# Patient Record
Sex: FEMALE | Race: WHITE | NOT HISPANIC OR LATINO | Employment: UNEMPLOYED | URBAN - METROPOLITAN AREA
[De-identification: names, ages, dates, MRNs, and addresses within clinical notes are randomized per-mention and may not be internally consistent; named-entity substitution may affect disease eponyms.]

---

## 2021-01-01 ENCOUNTER — APPOINTMENT (OUTPATIENT)
Dept: MRI IMAGING | Facility: HOSPITAL | Age: 46
End: 2021-01-01

## 2021-01-01 ENCOUNTER — HOSPITAL ENCOUNTER (EMERGENCY)
Facility: HOSPITAL | Age: 46
Discharge: HOME | End: 2021-08-31
Attending: EMERGENCY MEDICINE | Admitting: EMERGENCY MEDICINE

## 2021-01-01 ENCOUNTER — APPOINTMENT (OUTPATIENT)
Dept: CT IMAGING | Facility: HOSPITAL | Age: 46
End: 2021-01-01

## 2021-01-01 ENCOUNTER — TELEPHONE (OUTPATIENT)
Dept: UROLOGY | Facility: CLINIC | Age: 46
End: 2021-01-01

## 2021-01-01 ENCOUNTER — HOSPITAL ENCOUNTER (EMERGENCY)
Facility: HOSPITAL | Age: 46
Discharge: HOME | End: 2021-12-04
Attending: EMERGENCY MEDICINE | Admitting: EMERGENCY MEDICINE

## 2021-01-01 ENCOUNTER — OFFICE VISIT (OUTPATIENT)
Dept: UROLOGY | Facility: CLINIC | Age: 46
End: 2021-01-01

## 2021-01-01 VITALS
HEIGHT: 63 IN | TEMPERATURE: 98.6 F | OXYGEN SATURATION: 99 % | RESPIRATION RATE: 20 BRPM | BODY MASS INDEX: 17.72 KG/M2 | HEART RATE: 57 BPM | SYSTOLIC BLOOD PRESSURE: 139 MMHG | WEIGHT: 100 LBS | DIASTOLIC BLOOD PRESSURE: 81 MMHG

## 2021-01-01 VITALS
SYSTOLIC BLOOD PRESSURE: 174 MMHG | RESPIRATION RATE: 22 BRPM | DIASTOLIC BLOOD PRESSURE: 94 MMHG | HEART RATE: 57 BPM | BODY MASS INDEX: 20.89 KG/M2 | WEIGHT: 122.36 LBS | TEMPERATURE: 100.7 F | HEIGHT: 64 IN | OXYGEN SATURATION: 100 %

## 2021-01-01 VITALS — WEIGHT: 129.8 LBS | RESPIRATION RATE: 17 BRPM | BODY MASS INDEX: 23 KG/M2 | HEIGHT: 63 IN

## 2021-01-01 DIAGNOSIS — N12 PYELONEPHRITIS: Primary | ICD-10-CM

## 2021-01-01 DIAGNOSIS — N28.89 RENAL MASS: Primary | ICD-10-CM

## 2021-01-01 DIAGNOSIS — C64.1 RENAL CELL CARCINOMA OF RIGHT KIDNEY (HCC): ICD-10-CM

## 2021-01-01 DIAGNOSIS — E87.6 HYPOKALEMIA: ICD-10-CM

## 2021-01-01 DIAGNOSIS — N30.01 ACUTE CYSTITIS WITH HEMATURIA: ICD-10-CM

## 2021-01-01 DIAGNOSIS — N28.89 RENAL MASS: ICD-10-CM

## 2021-01-01 DIAGNOSIS — K59.03 DRUG-INDUCED CONSTIPATION: ICD-10-CM

## 2021-01-01 DIAGNOSIS — F11.93 OPIOID WITHDRAWAL (HCC): ICD-10-CM

## 2021-01-01 DIAGNOSIS — R11.2 NON-INTRACTABLE VOMITING WITH NAUSEA, UNSPECIFIED VOMITING TYPE: Primary | ICD-10-CM

## 2021-01-01 LAB
ALBUMIN SERPL-MCNC: 4.5 G/DL (ref 3.5–5.2)
ALBUMIN SERPL-MCNC: 5.4 G/DL (ref 3.5–5.2)
ALBUMIN/GLOB SERPL: 1.3 G/DL
ALBUMIN/GLOB SERPL: 1.4 G/DL
ALP SERPL-CCNC: 103 U/L (ref 39–117)
ALP SERPL-CCNC: 123 U/L (ref 39–117)
ALT SERPL W P-5'-P-CCNC: 13 U/L (ref 1–33)
ALT SERPL W P-5'-P-CCNC: 28 U/L (ref 1–33)
AMPHET+METHAMPHET UR QL: NEGATIVE
ANION GAP SERPL CALCULATED.3IONS-SCNC: 13.8 MMOL/L (ref 5–15)
ANION GAP SERPL CALCULATED.3IONS-SCNC: 23.4 MMOL/L (ref 5–15)
AST SERPL-CCNC: 17 U/L (ref 1–32)
AST SERPL-CCNC: 23 U/L (ref 1–32)
BACTERIA SPEC AEROBE CULT: ABNORMAL
BACTERIA SPEC AEROBE CULT: NORMAL
BACTERIA UR QL AUTO: ABNORMAL /HPF
BACTERIA UR QL AUTO: ABNORMAL /HPF
BARBITURATES UR QL SCN: NEGATIVE
BASOPHILS # BLD AUTO: 0.03 10*3/MM3 (ref 0–0.2)
BASOPHILS # BLD AUTO: 0.04 10*3/MM3 (ref 0–0.2)
BASOPHILS NFR BLD AUTO: 0.2 % (ref 0–1.5)
BASOPHILS NFR BLD AUTO: 0.4 % (ref 0–1.5)
BENZODIAZ UR QL SCN: NEGATIVE
BILIRUB SERPL-MCNC: 0.3 MG/DL (ref 0–1.2)
BILIRUB SERPL-MCNC: 0.7 MG/DL (ref 0–1.2)
BILIRUB UR QL STRIP: NEGATIVE
BILIRUB UR QL STRIP: NEGATIVE
BUN SERPL-MCNC: 23 MG/DL (ref 6–20)
BUN SERPL-MCNC: 24 MG/DL (ref 6–20)
BUN/CREAT SERPL: 24 (ref 7–25)
BUN/CREAT SERPL: 25.6 (ref 7–25)
CALCIUM SPEC-SCNC: 10.8 MG/DL (ref 8.6–10.5)
CALCIUM SPEC-SCNC: 9.3 MG/DL (ref 8.6–10.5)
CANNABINOIDS SERPL QL: NEGATIVE
CHLORIDE SERPL-SCNC: 100 MMOL/L (ref 98–107)
CHLORIDE SERPL-SCNC: 95 MMOL/L (ref 98–107)
CLARITY UR: ABNORMAL
CLARITY UR: ABNORMAL
CO2 SERPL-SCNC: 25.6 MMOL/L (ref 22–29)
CO2 SERPL-SCNC: 28.2 MMOL/L (ref 22–29)
COCAINE UR QL: NEGATIVE
COLOR UR: YELLOW
COLOR UR: YELLOW
CREAT SERPL-MCNC: 0.9 MG/DL (ref 0.57–1)
CREAT SERPL-MCNC: 1 MG/DL (ref 0.57–1)
D-LACTATE SERPL-SCNC: 2.2 MMOL/L (ref 0.5–2)
D-LACTATE SERPL-SCNC: 3.3 MMOL/L (ref 0.5–2)
DEPRECATED RDW RBC AUTO: 38.3 FL (ref 37–54)
DEPRECATED RDW RBC AUTO: 43.8 FL (ref 37–54)
EOSINOPHIL # BLD AUTO: 0 10*3/MM3 (ref 0–0.4)
EOSINOPHIL # BLD AUTO: 0.01 10*3/MM3 (ref 0–0.4)
EOSINOPHIL NFR BLD AUTO: 0 % (ref 0.3–6.2)
EOSINOPHIL NFR BLD AUTO: 0.1 % (ref 0.3–6.2)
ERYTHROCYTE [DISTWIDTH] IN BLOOD BY AUTOMATED COUNT: 13.3 % (ref 12.3–15.4)
ERYTHROCYTE [DISTWIDTH] IN BLOOD BY AUTOMATED COUNT: 14.6 % (ref 12.3–15.4)
GFR SERPL CREATININE-BSD FRML MDRD: 60 ML/MIN/1.73
GFR SERPL CREATININE-BSD FRML MDRD: 68 ML/MIN/1.73
GLOBULIN UR ELPH-MCNC: 3.2 GM/DL
GLOBULIN UR ELPH-MCNC: 4.1 GM/DL
GLUCOSE SERPL-MCNC: 125 MG/DL (ref 65–99)
GLUCOSE SERPL-MCNC: 131 MG/DL (ref 65–99)
GLUCOSE UR STRIP-MCNC: NEGATIVE MG/DL
GLUCOSE UR STRIP-MCNC: NEGATIVE MG/DL
GRAM STN SPEC: ABNORMAL
HCG INTACT+B SERPL-ACNC: 1.34 MIU/ML
HCG INTACT+B SERPL-ACNC: 2.03 MIU/ML
HCT VFR BLD AUTO: 44.2 % (ref 34–46.6)
HCT VFR BLD AUTO: 45.8 % (ref 34–46.6)
HGB BLD-MCNC: 15.7 G/DL (ref 12–15.9)
HGB BLD-MCNC: 15.8 G/DL (ref 12–15.9)
HGB UR QL STRIP.AUTO: NEGATIVE
HGB UR QL STRIP.AUTO: NEGATIVE
HOLD SPECIMEN: NORMAL
HYALINE CASTS UR QL AUTO: ABNORMAL /LPF
HYALINE CASTS UR QL AUTO: ABNORMAL /LPF
IMM GRANULOCYTES # BLD AUTO: 0.04 10*3/MM3 (ref 0–0.05)
IMM GRANULOCYTES # BLD AUTO: 0.05 10*3/MM3 (ref 0–0.05)
IMM GRANULOCYTES NFR BLD AUTO: 0.3 % (ref 0–0.5)
IMM GRANULOCYTES NFR BLD AUTO: 0.4 % (ref 0–0.5)
ISOLATED FROM: ABNORMAL
KETONES UR QL STRIP: NEGATIVE
KETONES UR QL STRIP: NEGATIVE
LEUKOCYTE ESTERASE UR QL STRIP.AUTO: ABNORMAL
LEUKOCYTE ESTERASE UR QL STRIP.AUTO: ABNORMAL
LIPASE SERPL-CCNC: 57 U/L (ref 13–60)
LIPASE SERPL-CCNC: 66 U/L (ref 13–60)
LYMPHOCYTES # BLD AUTO: 1.38 10*3/MM3 (ref 0.7–3.1)
LYMPHOCYTES # BLD AUTO: 1.58 10*3/MM3 (ref 0.7–3.1)
LYMPHOCYTES NFR BLD AUTO: 13.9 % (ref 19.6–45.3)
LYMPHOCYTES NFR BLD AUTO: 9.5 % (ref 19.6–45.3)
MAGNESIUM SERPL-MCNC: 2 MG/DL (ref 1.6–2.6)
MCH RBC QN AUTO: 28.4 PG (ref 26.6–33)
MCH RBC QN AUTO: 28.5 PG (ref 26.6–33)
MCHC RBC AUTO-ENTMCNC: 34.5 G/DL (ref 31.5–35.7)
MCHC RBC AUTO-ENTMCNC: 35.5 G/DL (ref 31.5–35.7)
MCV RBC AUTO: 79.9 FL (ref 79–97)
MCV RBC AUTO: 82.5 FL (ref 79–97)
METHADONE UR QL SCN: NEGATIVE
MONOCYTES # BLD AUTO: 0.57 10*3/MM3 (ref 0.1–0.9)
MONOCYTES # BLD AUTO: 0.65 10*3/MM3 (ref 0.1–0.9)
MONOCYTES NFR BLD AUTO: 3.9 % (ref 5–12)
MONOCYTES NFR BLD AUTO: 5.7 % (ref 5–12)
NEUTROPHILS NFR BLD AUTO: 12.51 10*3/MM3 (ref 1.7–7)
NEUTROPHILS NFR BLD AUTO: 79.6 % (ref 42.7–76)
NEUTROPHILS NFR BLD AUTO: 86 % (ref 42.7–76)
NEUTROPHILS NFR BLD AUTO: 9.08 10*3/MM3 (ref 1.7–7)
NITRITE UR QL STRIP: NEGATIVE
NITRITE UR QL STRIP: NEGATIVE
NRBC BLD AUTO-RTO: 0 /100 WBC (ref 0–0.2)
NRBC BLD AUTO-RTO: 0 /100 WBC (ref 0–0.2)
OPIATES UR QL: NEGATIVE
OXYCODONE UR QL SCN: NEGATIVE
PH UR STRIP.AUTO: 7 [PH] (ref 5–8)
PH UR STRIP.AUTO: 7.5 [PH] (ref 5–8)
PLATELET # BLD AUTO: 327 10*3/MM3 (ref 140–450)
PLATELET # BLD AUTO: 374 10*3/MM3 (ref 140–450)
PMV BLD AUTO: 10.1 FL (ref 6–12)
PMV BLD AUTO: 9.9 FL (ref 6–12)
POTASSIUM SERPL-SCNC: 3 MMOL/L (ref 3.5–5.2)
POTASSIUM SERPL-SCNC: 3.5 MMOL/L (ref 3.5–5.2)
PROT SERPL-MCNC: 7.7 G/DL (ref 6–8.5)
PROT SERPL-MCNC: 9.5 G/DL (ref 6–8.5)
PROT UR QL STRIP: ABNORMAL
PROT UR QL STRIP: ABNORMAL
RBC # BLD AUTO: 5.53 10*6/MM3 (ref 3.77–5.28)
RBC # BLD AUTO: 5.55 10*6/MM3 (ref 3.77–5.28)
RBC # UR STRIP: ABNORMAL /HPF
RBC # UR: ABNORMAL /HPF
REF LAB TEST METHOD: ABNORMAL
REF LAB TEST METHOD: ABNORMAL
SARS-COV-2 N GENE RESP QL NAA+PROBE: NOT DETECTED
SODIUM SERPL-SCNC: 142 MMOL/L (ref 136–145)
SODIUM SERPL-SCNC: 144 MMOL/L (ref 136–145)
SP GR UR STRIP: 1.03 (ref 1–1.03)
SP GR UR STRIP: >1.03 (ref 1–1.03)
SQUAMOUS #/AREA URNS HPF: ABNORMAL /HPF
SQUAMOUS #/AREA URNS HPF: ABNORMAL /HPF
UROBILINOGEN UR QL STRIP: ABNORMAL
UROBILINOGEN UR QL STRIP: ABNORMAL
WBC # BLD AUTO: 11.4 10*3/MM3 (ref 3.4–10.8)
WBC # UR STRIP: ABNORMAL /HPF
WBC NRBC COR # BLD: 14.54 10*3/MM3 (ref 3.4–10.8)
WBC UR QL AUTO: ABNORMAL /HPF
WHOLE BLOOD HOLD SPECIMEN: NORMAL

## 2021-01-01 PROCEDURE — 80307 DRUG TEST PRSMV CHEM ANLYZR: CPT | Performed by: NURSE PRACTITIONER

## 2021-01-01 PROCEDURE — 96375 TX/PRO/DX INJ NEW DRUG ADDON: CPT

## 2021-01-01 PROCEDURE — 99284 EMERGENCY DEPT VISIT MOD MDM: CPT

## 2021-01-01 PROCEDURE — 83735 ASSAY OF MAGNESIUM: CPT | Performed by: NURSE PRACTITIONER

## 2021-01-01 PROCEDURE — 85025 COMPLETE CBC W/AUTO DIFF WBC: CPT | Performed by: EMERGENCY MEDICINE

## 2021-01-01 PROCEDURE — 96361 HYDRATE IV INFUSION ADD-ON: CPT

## 2021-01-01 PROCEDURE — 25010000002 CEFTRIAXONE PER 250 MG: Performed by: NURSE PRACTITIONER

## 2021-01-01 PROCEDURE — 81001 URINALYSIS AUTO W/SCOPE: CPT | Performed by: EMERGENCY MEDICINE

## 2021-01-01 PROCEDURE — 25010000002 DIPHENHYDRAMINE PER 50 MG: Performed by: NURSE PRACTITIONER

## 2021-01-01 PROCEDURE — 74176 CT ABD & PELVIS W/O CONTRAST: CPT

## 2021-01-01 PROCEDURE — 99204 OFFICE O/P NEW MOD 45 MIN: CPT | Performed by: UROLOGY

## 2021-01-01 PROCEDURE — 87147 CULTURE TYPE IMMUNOLOGIC: CPT | Performed by: NURSE PRACTITIONER

## 2021-01-01 PROCEDURE — 83690 ASSAY OF LIPASE: CPT | Performed by: EMERGENCY MEDICINE

## 2021-01-01 PROCEDURE — 96365 THER/PROPH/DIAG IV INF INIT: CPT

## 2021-01-01 PROCEDURE — 87635 SARS-COV-2 COVID-19 AMP PRB: CPT | Performed by: EMERGENCY MEDICINE

## 2021-01-01 PROCEDURE — 25010000002 KETOROLAC TROMETHAMINE PER 15 MG: Performed by: EMERGENCY MEDICINE

## 2021-01-01 PROCEDURE — 74177 CT ABD & PELVIS W/CONTRAST: CPT

## 2021-01-01 PROCEDURE — 87040 BLOOD CULTURE FOR BACTERIA: CPT | Performed by: NURSE PRACTITIONER

## 2021-01-01 PROCEDURE — 25010000002 ONDANSETRON PER 1 MG: Performed by: EMERGENCY MEDICINE

## 2021-01-01 PROCEDURE — 25010000002 METOCLOPRAMIDE PER 10 MG: Performed by: NURSE PRACTITIONER

## 2021-01-01 PROCEDURE — 80053 COMPREHEN METABOLIC PANEL: CPT | Performed by: EMERGENCY MEDICINE

## 2021-01-01 PROCEDURE — 0 IOPAMIDOL PER 1 ML: Performed by: EMERGENCY MEDICINE

## 2021-01-01 PROCEDURE — 84702 CHORIONIC GONADOTROPIN TEST: CPT | Performed by: EMERGENCY MEDICINE

## 2021-01-01 PROCEDURE — 25010000002 KETOROLAC TROMETHAMINE PER 15 MG: Performed by: NURSE PRACTITIONER

## 2021-01-01 PROCEDURE — 25010000002 CEFTRIAXONE PER 250 MG: Performed by: EMERGENCY MEDICINE

## 2021-01-01 PROCEDURE — 83605 ASSAY OF LACTIC ACID: CPT | Performed by: NURSE PRACTITIONER

## 2021-01-01 PROCEDURE — 25010000002 ONDANSETRON PER 1 MG: Performed by: NURSE PRACTITIONER

## 2021-01-01 RX ORDER — IBUPROFEN 600 MG/1
600 TABLET ORAL ONCE
Status: COMPLETED | OUTPATIENT
Start: 2021-01-01 | End: 2021-01-01

## 2021-01-01 RX ORDER — NAPROXEN 500 MG/1
500 TABLET ORAL 2 TIMES DAILY WITH MEALS
Qty: 14 TABLET | Refills: 0 | OUTPATIENT
Start: 2021-01-01 | End: 2021-01-01

## 2021-01-01 RX ORDER — ACETAMINOPHEN 500 MG
1000 TABLET ORAL EVERY 6 HOURS PRN
Qty: 30 TABLET | Refills: 0 | OUTPATIENT
Start: 2021-01-01 | End: 2021-01-01

## 2021-01-01 RX ORDER — ACETAMINOPHEN 325 MG/1
650 TABLET ORAL ONCE
Status: COMPLETED | OUTPATIENT
Start: 2021-01-01 | End: 2021-01-01

## 2021-01-01 RX ORDER — CEFTRIAXONE SODIUM 1 G/50ML
1 INJECTION, SOLUTION INTRAVENOUS ONCE
Status: COMPLETED | OUTPATIENT
Start: 2021-01-01 | End: 2021-01-01

## 2021-01-01 RX ORDER — POLYETHYLENE GLYCOL 3350 17 G/17G
17 POWDER, FOR SOLUTION ORAL DAILY
Qty: 10 PACKET | Refills: 0 | Status: SHIPPED | OUTPATIENT
Start: 2021-01-01 | End: 2021-01-01

## 2021-01-01 RX ORDER — FAMOTIDINE 10 MG/ML
20 INJECTION, SOLUTION INTRAVENOUS ONCE
Status: COMPLETED | OUTPATIENT
Start: 2021-01-01 | End: 2021-01-01

## 2021-01-01 RX ORDER — METOCLOPRAMIDE HYDROCHLORIDE 5 MG/ML
10 INJECTION INTRAMUSCULAR; INTRAVENOUS ONCE
Status: COMPLETED | OUTPATIENT
Start: 2021-01-01 | End: 2021-01-01

## 2021-01-01 RX ORDER — POTASSIUM CHLORIDE 750 MG/1
40 CAPSULE, EXTENDED RELEASE ORAL ONCE
Status: COMPLETED | OUTPATIENT
Start: 2021-01-01 | End: 2021-01-01

## 2021-01-01 RX ORDER — KETOROLAC TROMETHAMINE 15 MG/ML
15 INJECTION, SOLUTION INTRAMUSCULAR; INTRAVENOUS ONCE
Status: COMPLETED | OUTPATIENT
Start: 2021-01-01 | End: 2021-01-01

## 2021-01-01 RX ORDER — ONDANSETRON 4 MG/1
4 TABLET, ORALLY DISINTEGRATING ORAL EVERY 6 HOURS PRN
Qty: 15 TABLET | Refills: 0 | Status: SHIPPED | OUTPATIENT
Start: 2021-01-01 | End: 2022-01-01

## 2021-01-01 RX ORDER — ONDANSETRON 2 MG/ML
4 INJECTION INTRAMUSCULAR; INTRAVENOUS ONCE
Status: COMPLETED | OUTPATIENT
Start: 2021-01-01 | End: 2021-01-01

## 2021-01-01 RX ORDER — CEPHALEXIN 500 MG/1
500 CAPSULE ORAL 4 TIMES DAILY
Qty: 28 CAPSULE | Refills: 0 | Status: SHIPPED | OUTPATIENT
Start: 2021-01-01 | End: 2021-01-01

## 2021-01-01 RX ORDER — ONDANSETRON 4 MG/1
4 TABLET, ORALLY DISINTEGRATING ORAL EVERY 8 HOURS PRN
Qty: 9 TABLET | Refills: 0 | OUTPATIENT
Start: 2021-01-01 | End: 2021-01-01

## 2021-01-01 RX ORDER — KETOROLAC TROMETHAMINE 30 MG/ML
30 INJECTION, SOLUTION INTRAMUSCULAR; INTRAVENOUS ONCE
Status: COMPLETED | OUTPATIENT
Start: 2021-01-01 | End: 2021-01-01

## 2021-01-01 RX ORDER — SODIUM CHLORIDE 0.9 % (FLUSH) 0.9 %
10 SYRINGE (ML) INJECTION AS NEEDED
Status: DISCONTINUED | OUTPATIENT
Start: 2021-01-01 | End: 2021-01-01 | Stop reason: HOSPADM

## 2021-01-01 RX ORDER — DICYCLOMINE HCL 20 MG
20 TABLET ORAL EVERY 6 HOURS PRN
Qty: 30 TABLET | Refills: 0 | Status: SHIPPED | OUTPATIENT
Start: 2021-01-01 | End: 2022-01-01

## 2021-01-01 RX ORDER — CEPHALEXIN 500 MG/1
500 CAPSULE ORAL 4 TIMES DAILY
Qty: 28 CAPSULE | Refills: 0 | OUTPATIENT
Start: 2021-01-01 | End: 2021-01-01

## 2021-01-01 RX ORDER — DIPHENHYDRAMINE HYDROCHLORIDE 50 MG/ML
25 INJECTION INTRAMUSCULAR; INTRAVENOUS ONCE
Status: COMPLETED | OUTPATIENT
Start: 2021-01-01 | End: 2021-01-01

## 2021-01-01 RX ADMIN — METOCLOPRAMIDE 10 MG: 5 INJECTION, SOLUTION INTRAMUSCULAR; INTRAVENOUS at 02:07

## 2021-01-01 RX ADMIN — SODIUM CHLORIDE 1 G: 900 INJECTION INTRAVENOUS at 19:26

## 2021-01-01 RX ADMIN — IOPAMIDOL 100 ML: 755 INJECTION, SOLUTION INTRAVENOUS at 18:30

## 2021-01-01 RX ADMIN — ONDANSETRON 4 MG: 2 INJECTION INTRAMUSCULAR; INTRAVENOUS at 17:15

## 2021-01-01 RX ADMIN — IBUPROFEN 600 MG: 600 TABLET ORAL at 01:16

## 2021-01-01 RX ADMIN — KETOROLAC TROMETHAMINE 30 MG: 30 INJECTION, SOLUTION INTRAMUSCULAR; INTRAVENOUS at 21:16

## 2021-01-01 RX ADMIN — SODIUM CHLORIDE 1000 ML: 9 INJECTION, SOLUTION INTRAVENOUS at 21:13

## 2021-01-01 RX ADMIN — FAMOTIDINE 20 MG: 10 INJECTION INTRAVENOUS at 21:15

## 2021-01-01 RX ADMIN — SODIUM CHLORIDE 1665 ML: 9 INJECTION, SOLUTION INTRAVENOUS at 23:49

## 2021-01-01 RX ADMIN — KETOROLAC TROMETHAMINE 15 MG: 15 INJECTION, SOLUTION INTRAMUSCULAR; INTRAVENOUS at 17:53

## 2021-01-01 RX ADMIN — DIPHENHYDRAMINE HYDROCHLORIDE 25 MG: 50 INJECTION, SOLUTION INTRAMUSCULAR; INTRAVENOUS at 02:06

## 2021-01-01 RX ADMIN — CEFTRIAXONE SODIUM 1 G: 1 INJECTION, SOLUTION INTRAVENOUS at 23:48

## 2021-01-01 RX ADMIN — ONDANSETRON 4 MG: 2 INJECTION INTRAMUSCULAR; INTRAVENOUS at 21:15

## 2021-01-01 RX ADMIN — POTASSIUM CHLORIDE 40 MEQ: 750 CAPSULE, EXTENDED RELEASE ORAL at 01:17

## 2021-01-01 RX ADMIN — SODIUM CHLORIDE 1000 ML: 9 INJECTION, SOLUTION INTRAVENOUS at 19:26

## 2021-01-01 RX ADMIN — SODIUM CHLORIDE 1000 ML: 9 INJECTION, SOLUTION INTRAVENOUS at 17:15

## 2021-01-01 RX ADMIN — ACETAMINOPHEN 650 MG: 325 TABLET ORAL at 21:15

## 2021-09-21 PROBLEM — N28.89 RENAL MASS: Status: ACTIVE | Noted: 2021-01-01

## 2021-09-22 NOTE — PROGRESS NOTES
Chief Complaint: Urologic complaint    Subjective         History of Present Illness  Mariam Blanco is a 45 y.o. female presents to [facility] to be seen for UTI renal mass    Patient recently seen in the emergency room for UTI she was also low-grade fevers and nausea.  She was treated with Keflex.  Patient is feeling better after antibiotics.  She is having some lower extremity swelling which are improving.  Symmetrical.    8/31/2021 CT abdomen/pelvis with - multiple areas of focal scarring on each kidney. Medial right kidney has 3 x 2.7 cm circumscribed mass with Hounsfield units of 103. Either hyperdense cyst or neoplasm. Small cyst in the medial portion of the left kidney.    8/31/21  UA-0-2 RBC, many white cells, bacteria 4+ 0-2 epithelial cells, nitrite negative    No gross hematuria    Patient had 1 kidney stone    No urologic family history,   Has never had any urologic surgery.    No cardiopulmonary history.  Smokes 0.5 packs/day .  Patient does not use blood thinner.        Objective     History reviewed. No pertinent past medical history.    History reviewed. No pertinent surgical history.      Current Outpatient Medications:   •  acetaminophen (TYLENOL) 500 MG tablet, Take 2 tablets by mouth Every 6 (Six) Hours As Needed for Mild Pain  or Fever., Disp: 30 tablet, Rfl: 0  •  cephalexin (KEFLEX) 500 MG capsule, Take 1 capsule by mouth 4 (Four) Times a Day., Disp: 28 capsule, Rfl: 0  •  naproxen (EC NAPROSYN) 500 MG EC tablet, Take 1 tablet by mouth 2 (Two) Times a Day With Meals., Disp: 14 tablet, Rfl: 0  •  ondansetron ODT (ZOFRAN-ODT) 4 MG disintegrating tablet, Place 1 tablet on the tongue Every 8 (Eight) Hours As Needed for Nausea., Disp: 9 tablet, Rfl: 0    No Known Allergies     History reviewed. No pertinent family history.    Social History     Socioeconomic History   • Marital status:      Spouse name: Not on file   • Number of children: Not on file   • Years of  "education: Not on file   • Highest education level: Not on file   Tobacco Use   • Smoking status: Current Every Day Smoker     Packs/day: 1.50   • Smokeless tobacco: Never Used   Vaping Use   • Vaping Use: Never used   Substance and Sexual Activity   • Alcohol use: Not Currently   • Drug use: Never       Vital Signs:   Resp 17   Ht 160 cm (63\")   Wt 58.9 kg (129 lb 12.8 oz)   BMI 22.99 kg/m²      Physical exam    Alert and orient x3  Well appearing, well developed, in no acute distress   Unlabored respirations  Nontender/nondistended  Grossly oriented to person, place and time, judgment is intact, normal mood and affect              Assessment and Plan    Diagnoses and all orders for this visit:    1. Renal mass (Primary)      CT images and read reviewed and discussed with patient    Records reviewed and summarized in chart    Patient with a possible enhancing mass in her right kidney.  After discussion of risk benefits we will get an MRI of the abdomen with and without contrast to rule out enhancing lesion. we did discuss that this could be a possible kidney malignancy and she must continue to follow-up or could be detrimental to her health or cause death.  Patient voiced understanding    Follow-up after MRI     "

## 2021-11-08 NOTE — TELEPHONE ENCOUNTER
Called pt to see if she about rescheduling the MRI of the abdomen. No answer. LMOM for her to call back. Dr. Schwartz was evaluating her for a kidney mass so MRI is necessary and encouraged if she calls back.

## 2021-12-04 NOTE — DISCHARGE INSTRUCTIONS
Clear liquids.  Advance diet as tolerated.  Try to drink plenty of fluids.    Rest.    Take medications as prescribed.    Fever control by alternating Tylenol and Motrin.    Follow-up with your PCP and urologist next week.  Call Monday for appointment

## 2021-12-04 NOTE — ED PROVIDER NOTES
Time: 03:36 EST  Arrived by: EMS  Chief Complaint: Nausea and vomiting  History provided by: Patient  History is limited by: N/A    History of Present Illness:  Patient is a 45 y.o. 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 Primary symptoms do not include diarrhea, hematemesis, hematochezia, dysuria, arthralgias or rash. The illness began 6 to 7 days ago.   The abdominal pain began more than 2 days ago. The abdominal pain has been gradually worsening since its onset. The abdominal pain is located in the right flank. The abdominal pain radiates to the RLQ. The severity of the abdominal pain is 7/10. The abdominal pain is relieved by nothing.   Nausea began 6 to 7 days ago. The nausea is associated with eating (Opioid withdrawal).   The illness is also significant for chills and back pain.   Nausea  The primary symptoms include fever ( today), fatigue, abdominal pain, nausea, vomiting and myalgias. Primary symptoms do not include diarrhea, hematemesis, hematochezia, dysuria, arthralgias or rash.   The illness is also significant for chills and back pain.   Withdrawal  Associated symptoms: abdominal pain, fatigue, fever ( today), myalgias, nausea and vomiting    Associated symptoms: no chest pain, no congestion, no cough, no diarrhea, no ear pain, no rash, no rhinorrhea, no shortness of breath and no sore throat            Similar Symptoms Previously: went 3 years without using.   Recently seen: follow ups ith dr noland for renal cell carcinoma. Going to have right nephrectomy but hasnt been scheduled      Patient Care Team  Primary Care Provider: Shyanne francisco  Urologist dr noland    Past Medical History:     No Known Allergies  Past Medical History:   Diagnosis Date   •  Cancer (HCC)    • Cancer of kidney (HCC)      Past Surgical History:   Procedure Laterality Date   • CHOLECYSTECTOMY       History reviewed. No pertinent family history.    Home Medications:  Prior to Admission medications    Medication Sig Start Date End Date Taking? Authorizing Provider   acetaminophen (TYLENOL) 500 MG tablet Take 2 tablets by mouth Every 6 (Six) Hours As Needed for Mild Pain  or Fever. 8/31/21   Stanford Hill, DO   cephalexin (KEFLEX) 500 MG capsule Take 1 capsule by mouth 4 (Four) Times a Day. 8/31/21   Stanford Hill DO   naproxen (EC NAPROSYN) 500 MG EC tablet Take 1 tablet by mouth 2 (Two) Times a Day With Meals. 8/31/21   Stanford Hill, DO   ondansetron ODT (ZOFRAN-ODT) 4 MG disintegrating tablet Place 1 tablet on the tongue Every 8 (Eight) Hours As Needed for Nausea. 8/31/21   Stanford Hill, DO        Social History:   PT  reports that she has been smoking. She has been smoking about 0.50 packs per day. She has never used smokeless tobacco. She reports current drug use. She reports that she does not drink alcohol.    Record Review:  I have reviewed the patient's records in [facility].     Review of Systems  Review of Systems   Constitutional: Positive for chills, fatigue and fever ( today).   HENT: Negative for congestion, ear pain, rhinorrhea and sore throat.    Eyes: Negative.    Respiratory: Negative for cough and shortness of breath.    Cardiovascular: Negative for chest pain.   Gastrointestinal: Positive for abdominal pain, nausea and vomiting. Negative for diarrhea, hematemesis and hematochezia.   Genitourinary: Positive for decreased urine volume and flank pain ( right side). Negative for dysuria.   Musculoskeletal: Positive for back pain and myalgias. Negative for arthralgias.   Skin: Negative for rash.   Neurological: Negative.    Hematological: Negative.    Psychiatric/Behavioral: The patient is nervous/anxious.         Physical Exam  /94   Pulse 57   Temp (!) 100.7 °F (38.2 °C)  "(Oral)   Resp 22   Ht 162.6 cm (64\")   Wt 55.5 kg (122 lb 5.7 oz)   SpO2 100%   BMI 21.00 kg/m²     Physical Exam  Vitals and nursing note reviewed.   Constitutional:       General: She is not in acute distress.     Appearance: She is ill-appearing. She is not toxic-appearing.   HENT:      Head: Normocephalic and atraumatic.      Right Ear: Tympanic membrane, ear canal and external ear normal.      Left Ear: Tympanic membrane, ear canal and external ear normal.      Nose: Nose normal.      Mouth/Throat:      Mouth: Mucous membranes are moist.      Pharynx: No oropharyngeal exudate or posterior oropharyngeal erythema.   Eyes:      General: No scleral icterus.     Conjunctiva/sclera: Conjunctivae normal.      Pupils: Pupils are equal, round, and reactive to light.   Cardiovascular:      Rate and Rhythm: Normal rate and regular rhythm.      Pulses: Normal pulses.      Heart sounds: Normal heart sounds.   Pulmonary:      Effort: Pulmonary effort is normal. No respiratory distress.      Breath sounds: Normal breath sounds.   Abdominal:      General: Abdomen is flat. Bowel sounds are normal.      Palpations: Abdomen is soft.      Tenderness: There is abdominal tenderness ( Right lower quadrant and right lateral abdomen). There is right CVA tenderness. There is no left CVA tenderness.   Musculoskeletal:         General: Normal range of motion.      Cervical back: Normal range of motion and neck supple.   Skin:     General: Skin is warm and dry.   Neurological:      Mental Status: She is alert and oriented to person, place, and time.   Psychiatric:         Mood and Affect: Mood normal.         Behavior: Behavior normal.         Thought Content: Thought content normal.         Judgment: Judgment normal.                  ED Course  /94   Pulse 57   Temp (!) 100.7 °F (38.2 °C) (Oral)   Resp 22   Ht 162.6 cm (64\")   Wt 55.5 kg (122 lb 5.7 oz)   SpO2 100%   BMI 21.00 kg/m²   Results for orders placed or " performed during the hospital encounter of 12/03/21   Comprehensive Metabolic Panel    Specimen: Blood   Result Value Ref Range    Glucose 131 (H) 65 - 99 mg/dL    BUN 23 (H) 6 - 20 mg/dL    Creatinine 0.90 0.57 - 1.00 mg/dL    Sodium 142 136 - 145 mmol/L    Potassium 3.0 (L) 3.5 - 5.2 mmol/L    Chloride 100 98 - 107 mmol/L    CO2 28.2 22.0 - 29.0 mmol/L    Calcium 9.3 8.6 - 10.5 mg/dL    Total Protein 7.7 6.0 - 8.5 g/dL    Albumin 4.50 3.50 - 5.20 g/dL    ALT (SGPT) 28 1 - 33 U/L    AST (SGOT) 17 1 - 32 U/L    Alkaline Phosphatase 103 39 - 117 U/L    Total Bilirubin 0.3 0.0 - 1.2 mg/dL    eGFR Non African Amer 68 >60 mL/min/1.73    Globulin 3.2 gm/dL    A/G Ratio 1.4 g/dL    BUN/Creatinine Ratio 25.6 (H) 7.0 - 25.0    Anion Gap 13.8 5.0 - 15.0 mmol/L   Lipase    Specimen: Blood   Result Value Ref Range    Lipase 66 (H) 13 - 60 U/L   Urinalysis With Microscopic If Indicated (No Culture) - Urine, Clean Catch    Specimen: Urine, Clean Catch   Result Value Ref Range    Color, UA Yellow Yellow, Straw    Appearance, UA Cloudy (A) Clear    pH, UA 7.5 5.0 - 8.0    Specific Gravity, UA 1.026 1.005 - 1.030    Glucose, UA Negative Negative    Ketones, UA Negative Negative    Bilirubin, UA Negative Negative    Blood, UA Negative Negative    Protein,  mg/dL (2+) (A) Negative    Leuk Esterase, UA Small (1+) (A) Negative    Nitrite, UA Negative Negative    Urobilinogen, UA 1.0 E.U./dL 0.2 - 1.0 E.U./dL   hCG, Quantitative, Pregnancy    Specimen: Blood   Result Value Ref Range    HCG Quantitative 1.34 mIU/mL   CBC Auto Differential    Specimen: Blood   Result Value Ref Range    WBC 14.54 (H) 3.40 - 10.80 10*3/mm3    RBC 5.53 (H) 3.77 - 5.28 10*6/mm3    Hemoglobin 15.7 12.0 - 15.9 g/dL    Hematocrit 44.2 34.0 - 46.6 %    MCV 79.9 79.0 - 97.0 fL    MCH 28.4 26.6 - 33.0 pg    MCHC 35.5 31.5 - 35.7 g/dL    RDW 13.3 12.3 - 15.4 %    RDW-SD 38.3 37.0 - 54.0 fl    MPV 9.9 6.0 - 12.0 fL    Platelets 327 140 - 450 10*3/mm3     Neutrophil % 86.0 (H) 42.7 - 76.0 %    Lymphocyte % 9.5 (L) 19.6 - 45.3 %    Monocyte % 3.9 (L) 5.0 - 12.0 %    Eosinophil % 0.1 (L) 0.3 - 6.2 %    Basophil % 0.2 0.0 - 1.5 %    Immature Grans % 0.3 0.0 - 0.5 %    Neutrophils, Absolute 12.51 (H) 1.70 - 7.00 10*3/mm3    Lymphocytes, Absolute 1.38 0.70 - 3.10 10*3/mm3    Monocytes, Absolute 0.57 0.10 - 0.90 10*3/mm3    Eosinophils, Absolute 0.01 0.00 - 0.40 10*3/mm3    Basophils, Absolute 0.03 0.00 - 0.20 10*3/mm3    Immature Grans, Absolute 0.04 0.00 - 0.05 10*3/mm3    nRBC 0.0 0.0 - 0.2 /100 WBC   Lactic Acid, Plasma    Specimen: Blood   Result Value Ref Range    Lactate 3.3 (C) 0.5 - 2.0 mmol/L   Urine Drug Screen - Urine, Clean Catch    Specimen: Urine, Clean Catch   Result Value Ref Range    Amphet/Methamphet, Screen Negative Negative    Barbiturates Screen, Urine Negative Negative    Benzodiazepine Screen, Urine Negative Negative    Cocaine Screen, Urine Negative Negative    Opiate Screen Negative Negative    THC, Screen, Urine Negative Negative    Methadone Screen, Urine Negative Negative    Oxycodone Screen, Urine Negative Negative   STAT Lactic Acid, Reflex    Specimen: Blood   Result Value Ref Range    Lactate 2.2 (C) 0.5 - 2.0 mmol/L   Urinalysis, Microscopic Only - Urine, Clean Catch    Specimen: Urine, Clean Catch   Result Value Ref Range    RBC, UA 3-5 (A) None Seen /HPF    WBC, UA 31-50 (A) None Seen /HPF    Bacteria, UA 2+ (A) None Seen /HPF    Squamous Epithelial Cells, UA 3-6 (A) None Seen, 0-2 /HPF    Hyaline Casts, UA 13-20 None Seen /LPF    Methodology Automated Microscopy    Magnesium    Specimen: Blood   Result Value Ref Range    Magnesium 2.0 1.6 - 2.6 mg/dL   Green Top (Gel)   Result Value Ref Range    Extra Tube Hold for add-ons.    Lavender Top   Result Value Ref Range    Extra Tube hold for add-on    Gold Top - SST   Result Value Ref Range    Extra Tube Hold for add-ons.    Light Blue Top   Result Value Ref Range    Extra Tube hold for  add-on    Lavender Top   Result Value Ref Range    Extra Tube hold for add-on      Medications   sodium chloride 0.9 % bolus 1,000 mL (0 mL Intravenous Stopped 12/3/21 2348)   ondansetron (ZOFRAN) injection 4 mg (4 mg Intravenous Given 12/3/21 2115)   famotidine (PEPCID) injection 20 mg (20 mg Intravenous Given 12/3/21 2115)   ketorolac (TORADOL) injection 30 mg (30 mg Intravenous Given 12/3/21 2116)   acetaminophen (TYLENOL) tablet 650 mg (650 mg Oral Given 12/3/21 2115)   sodium chloride 0.9 % bolus 1,665 mL (0 mL/kg × 55.5 kg Intravenous Stopped 12/4/21 0111)   cefTRIAXone (ROCEPHIN) IVPB 1 g (0 g Intravenous Stopped 12/4/21 0111)   ibuprofen (ADVIL,MOTRIN) tablet 600 mg (600 mg Oral Given 12/4/21 0116)   potassium chloride (MICRO-K) CR capsule 40 mEq (40 mEq Oral Given 12/4/21 0117)   metoclopramide (REGLAN) injection 10 mg (10 mg Intravenous Given 12/4/21 0207)   diphenhydrAMINE (BENADRYL) injection 25 mg (25 mg Intravenous Given 12/4/21 0206)     CT Abdomen Pelvis Without Contrast    Result Date: 12/3/2021  Narrative: PROCEDURE: CT ABDOMEN PELVIS WO CONTRAST  COMPARISON: [facility], CT, CT ABD-PELVIS W CONTRAST, 8/31/2021, 18:27.  INDICATIONS: RIGHT-SIDED ABDOMEN PAIN & NAUSEA TODAY.  TECHNIQUE: 647 CT images were created without intravenous or oral contrast agent administration.   PROTOCOL:   Standard imaging protocol performed    RADIATION:   Automated exposure control was utilized to minimize radiation dose.  FINDINGS: Formed stool is seen throughout the colon.  There may be fecal stasis as with constipation.  Fecal impaction is possible.  The maximum transverse diameter of the rectum is 8 cm.  No definite stercoral ulceration.  Similar findings were seen previously.  No acute appendicitis.  No acute colitis or diverticulitis.  No pneumoperitoneum or pneumatosis.  No mechanical bowel obstruction.  There are bilateral breast implants with dense calcification of the fibrous capsules, as before.   A small hiatal hernia is seen.  The patient has undergone cholecystectomy.  Renal cortical scarring is seen bilaterally.  No renal stones or hydronephrosis or obstructive uropathy.  Again demonstrated is a right renal mass, better seen on the prior enhanced CT examination.  It measures about 3.4 cm in greatest diameter.  Previously, it measured about 3 cm in greatest diameter.  No definite associated pulmonary or osseous metastatic disease.  Please correlate with interval diagnosis and treatment history.  No hydronephrosis or obstructive uropathy is seen.  No urinary bladder calculi are identified.  Again mixed lytic and sclerotic areas are seen in the periarticular regions of the pelvis and involving the hip joints and may be related to Paget disease.  A congenital chondrodysplasia is possible.  The other incidental nonemergent findings are as described in the prior CT exam report from 8/31/2021.  CONCLUSION:   1. Persistent suspicious right renal mass is noted, which is most suggestive of a right renal cell carcinoma.  The finding is better seen on the prior enhanced CT examination.  Please correlate with interval diagnosis or treatment history.   2. Fecal stasis as with constipation and fecal impaction are suggested.   3. No mechanical bowel obstruction is appreciated.  No pneumoperitoneum or pneumatosis.  No perirectal fluid collections are identified.  No definite stercoral ulceration.   4. Otherwise, no significant interval change is seen since the prior 8/31/2021 CT study.     Pertinent findings were phoned to EARLENE Summers (ordering/attending [facility] ED Clinician) at approximately 2143 hours on 12/3/2021.  TRIPP BAIRES JR, MD       Electronically Signed and Approved By: TRIPP BAIRES JR, MD on 12/03/2021 at 21:46               Medical Decision Making:                     MDM  Number of Diagnoses or Management Options  Acute cystitis with hematuria  Drug-induced constipation  Hypokalemia  Non-intractable  vomiting with nausea, unspecified vomiting type  Opioid withdrawal (HCC)  Renal cell carcinoma of right kidney (HCC)  Diagnosis management comments: I have spoken with the patient. I have explained the patient´s condition, diagnoses and treatment plan based on the information available to me at this time. I have answered the patient's questions and addressed any concerns. The patient has a good  understanding of the patient´s diagnosis, condition, and treatment plan as can be expected at this point. The vital signs have been stable. The patient´s condition is stable and appropriate for discharge from the emergency department.      The patient will pursue further outpatient evaluation with the primary care physician or other designated or consulting physician as outlined in the discharge instructions. They are agreeable to this plan of care and follow-up instructions have been explained in detail. The patient has received these instructions in written format and have expressed an understanding of the discharge instructions. The patient is aware that any significant change in condition or worsening of symptoms should prompt an immediate return to this or the closest emergency department or call to 911.         Amount and/or Complexity of Data Reviewed  Clinical lab tests: reviewed and ordered  Tests in the radiology section of CPT®: reviewed and ordered  Tests in the medicine section of CPT®: ordered and reviewed         Final diagnoses:   Non-intractable vomiting with nausea, unspecified vomiting type   Renal cell carcinoma of right kidney (HCC)   Drug-induced constipation   Acute cystitis with hematuria   Hypokalemia   Opioid withdrawal (HCC)        Disposition:  ED Disposition     ED Disposition Condition Comment    Discharge Stable            Rose Marie Chester, APRN  12/04/21 0336

## 2022-01-01 ENCOUNTER — READMISSION MANAGEMENT (OUTPATIENT)
Dept: CALL CENTER | Facility: HOSPITAL | Age: 47
End: 2022-01-01

## 2022-01-01 ENCOUNTER — HOSPITAL ENCOUNTER (INPATIENT)
Facility: HOSPITAL | Age: 47
LOS: 2 days | Discharge: HOME | End: 2022-01-11
Attending: EMERGENCY MEDICINE | Admitting: INTERNAL MEDICINE

## 2022-01-01 ENCOUNTER — TELEPHONE (OUTPATIENT)
Dept: UROLOGY | Facility: CLINIC | Age: 47
End: 2022-01-01

## 2022-01-01 ENCOUNTER — TRANSCRIBE ORDERS (OUTPATIENT)
Dept: ADMINISTRATIVE | Facility: HOSPITAL | Age: 47
End: 2022-01-01

## 2022-01-01 ENCOUNTER — APPOINTMENT (OUTPATIENT)
Dept: NUCLEAR MEDICINE | Facility: HOSPITAL | Age: 47
End: 2022-01-01

## 2022-01-01 ENCOUNTER — APPOINTMENT (OUTPATIENT)
Dept: CT IMAGING | Facility: HOSPITAL | Age: 47
End: 2022-01-01

## 2022-01-01 VITALS
BODY MASS INDEX: 20.81 KG/M2 | WEIGHT: 121.91 LBS | OXYGEN SATURATION: 97 % | HEIGHT: 64 IN | RESPIRATION RATE: 16 BRPM | TEMPERATURE: 98.7 F | HEART RATE: 57 BPM | DIASTOLIC BLOOD PRESSURE: 80 MMHG | SYSTOLIC BLOOD PRESSURE: 157 MMHG

## 2022-01-01 DIAGNOSIS — N28.89 RENAL MASS: ICD-10-CM

## 2022-01-01 DIAGNOSIS — N28.89 RENAL MASS, RIGHT: Primary | ICD-10-CM

## 2022-01-01 DIAGNOSIS — N28.89 RIGHT RENAL MASS: ICD-10-CM

## 2022-01-01 DIAGNOSIS — R19.7 DIARRHEA, UNSPECIFIED TYPE: ICD-10-CM

## 2022-01-01 DIAGNOSIS — Z01.818 OTHER SPECIFIED PRE-OPERATIVE EXAMINATION: ICD-10-CM

## 2022-01-01 DIAGNOSIS — R11.2 NON-INTRACTABLE VOMITING WITH NAUSEA, UNSPECIFIED VOMITING TYPE: ICD-10-CM

## 2022-01-01 DIAGNOSIS — N28.89 RIGHT RENAL MASS: Primary | ICD-10-CM

## 2022-01-01 DIAGNOSIS — N30.00 ACUTE CYSTITIS WITHOUT HEMATURIA: Primary | ICD-10-CM

## 2022-01-01 DIAGNOSIS — E87.6 HYPOKALEMIA: ICD-10-CM

## 2022-01-01 DIAGNOSIS — R45.851 SUICIDAL IDEATIONS: ICD-10-CM

## 2022-01-01 LAB
25(OH)D3 SERPL-MCNC: 33.4 NG/ML (ref 30–100)
ALBUMIN SERPL-MCNC: 4.8 G/DL (ref 3.5–5.2)
ALBUMIN SERPL-MCNC: 4.9 G/DL (ref 3.5–5.2)
ALBUMIN/GLOB SERPL: 1.6 G/DL
ALBUMIN/GLOB SERPL: 1.6 G/DL
ALP SERPL-CCNC: 80 U/L (ref 39–117)
ALP SERPL-CCNC: 90 U/L (ref 39–117)
ALT SERPL W P-5'-P-CCNC: 14 U/L (ref 1–33)
ALT SERPL W P-5'-P-CCNC: 15 U/L (ref 1–33)
AMPHET+METHAMPHET UR QL: NEGATIVE
ANION GAP SERPL CALCULATED.3IONS-SCNC: 12.2 MMOL/L (ref 5–15)
ANION GAP SERPL CALCULATED.3IONS-SCNC: 13.8 MMOL/L (ref 5–15)
ANION GAP SERPL CALCULATED.3IONS-SCNC: 14.3 MMOL/L (ref 5–15)
ANION GAP SERPL CALCULATED.3IONS-SCNC: 15 MMOL/L (ref 5–15)
APAP SERPL-MCNC: <5 MCG/ML (ref 0–30)
AST SERPL-CCNC: 14 U/L (ref 1–32)
AST SERPL-CCNC: 16 U/L (ref 1–32)
BACTERIA SPEC AEROBE CULT: NO GROWTH
BACTERIA SPEC AEROBE CULT: NORMAL
BACTERIA UR QL AUTO: ABNORMAL /HPF
BARBITURATES UR QL SCN: NEGATIVE
BASOPHILS # BLD AUTO: 0.06 10*3/MM3 (ref 0–0.2)
BASOPHILS # BLD AUTO: 0.06 10*3/MM3 (ref 0–0.2)
BASOPHILS # BLD AUTO: 0.07 10*3/MM3 (ref 0–0.2)
BASOPHILS NFR BLD AUTO: 0.6 % (ref 0–1.5)
BASOPHILS NFR BLD AUTO: 0.6 % (ref 0–1.5)
BASOPHILS NFR BLD AUTO: 0.8 % (ref 0–1.5)
BENZODIAZ UR QL SCN: NEGATIVE
BILIRUB SERPL-MCNC: 1 MG/DL (ref 0–1.2)
BILIRUB SERPL-MCNC: 1.1 MG/DL (ref 0–1.2)
BILIRUB UR QL STRIP: NEGATIVE
BUN SERPL-MCNC: 15 MG/DL (ref 6–20)
BUN SERPL-MCNC: 18 MG/DL (ref 6–20)
BUN SERPL-MCNC: 22 MG/DL (ref 6–20)
BUN SERPL-MCNC: 22 MG/DL (ref 6–20)
BUN/CREAT SERPL: 19.5 (ref 7–25)
BUN/CREAT SERPL: 21.7 (ref 7–25)
BUN/CREAT SERPL: 25.3 (ref 7–25)
BUN/CREAT SERPL: 26.2 (ref 7–25)
CALCIUM SPEC-SCNC: 10 MG/DL (ref 8.6–10.5)
CALCIUM SPEC-SCNC: 9 MG/DL (ref 8.6–10.5)
CALCIUM SPEC-SCNC: 9 MG/DL (ref 8.6–10.5)
CALCIUM SPEC-SCNC: 9.5 MG/DL (ref 8.6–10.5)
CANNABINOIDS SERPL QL: NEGATIVE
CHLORIDE SERPL-SCNC: 100 MMOL/L (ref 98–107)
CHLORIDE SERPL-SCNC: 101 MMOL/L (ref 98–107)
CHLORIDE SERPL-SCNC: 91 MMOL/L (ref 98–107)
CHLORIDE SERPL-SCNC: 91 MMOL/L (ref 98–107)
CHOLEST SERPL-MCNC: 228 MG/DL (ref 0–200)
CLARITY UR: CLEAR
CO2 SERPL-SCNC: 22.8 MMOL/L (ref 22–29)
CO2 SERPL-SCNC: 24.7 MMOL/L (ref 22–29)
CO2 SERPL-SCNC: 27.2 MMOL/L (ref 22–29)
CO2 SERPL-SCNC: 30 MMOL/L (ref 22–29)
COCAINE UR QL: NEGATIVE
COLOR UR: YELLOW
CREAT SERPL-MCNC: 0.77 MG/DL (ref 0.57–1)
CREAT SERPL-MCNC: 0.83 MG/DL (ref 0.57–1)
CREAT SERPL-MCNC: 0.84 MG/DL (ref 0.57–1)
CREAT SERPL-MCNC: 0.87 MG/DL (ref 0.57–1)
DEPRECATED RDW RBC AUTO: 37.6 FL (ref 37–54)
DEPRECATED RDW RBC AUTO: 38.8 FL (ref 37–54)
DEPRECATED RDW RBC AUTO: 39.7 FL (ref 37–54)
EOSINOPHIL # BLD AUTO: 0.08 10*3/MM3 (ref 0–0.4)
EOSINOPHIL # BLD AUTO: 0.34 10*3/MM3 (ref 0–0.4)
EOSINOPHIL # BLD AUTO: 0.51 10*3/MM3 (ref 0–0.4)
EOSINOPHIL NFR BLD AUTO: 0.8 % (ref 0.3–6.2)
EOSINOPHIL NFR BLD AUTO: 3.5 % (ref 0.3–6.2)
EOSINOPHIL NFR BLD AUTO: 6 % (ref 0.3–6.2)
ERYTHROCYTE [DISTWIDTH] IN BLOOD BY AUTOMATED COUNT: 13.1 % (ref 12.3–15.4)
ETHANOL BLD-MCNC: <10 MG/DL (ref 0–10)
ETHANOL UR QL: <0.01 %
GFR SERPL CREATININE-BSD FRML MDRD: 70 ML/MIN/1.73
GFR SERPL CREATININE-BSD FRML MDRD: 73 ML/MIN/1.73
GFR SERPL CREATININE-BSD FRML MDRD: 74 ML/MIN/1.73
GFR SERPL CREATININE-BSD FRML MDRD: 81 ML/MIN/1.73
GLOBULIN UR ELPH-MCNC: 3 GM/DL
GLOBULIN UR ELPH-MCNC: 3.1 GM/DL
GLUCOSE SERPL-MCNC: 110 MG/DL (ref 65–99)
GLUCOSE SERPL-MCNC: 112 MG/DL (ref 65–99)
GLUCOSE SERPL-MCNC: 112 MG/DL (ref 65–99)
GLUCOSE SERPL-MCNC: 126 MG/DL (ref 65–99)
GLUCOSE UR STRIP-MCNC: NEGATIVE MG/DL
HBA1C MFR BLD: 6.26 % (ref 4.8–5.6)
HCG SERPL QL: NEGATIVE
HCT VFR BLD AUTO: 42 % (ref 34–46.6)
HCT VFR BLD AUTO: 42.8 % (ref 34–46.6)
HCT VFR BLD AUTO: 44 % (ref 34–46.6)
HDLC SERPL-MCNC: 61 MG/DL (ref 40–60)
HGB BLD-MCNC: 14.5 G/DL (ref 12–15.9)
HGB BLD-MCNC: 14.6 G/DL (ref 12–15.9)
HGB BLD-MCNC: 15.6 G/DL (ref 12–15.9)
HGB UR QL STRIP.AUTO: NEGATIVE
HOLD SPECIMEN: NORMAL
HYALINE CASTS UR QL AUTO: ABNORMAL /LPF
IMM GRANULOCYTES # BLD AUTO: 0.01 10*3/MM3 (ref 0–0.05)
IMM GRANULOCYTES # BLD AUTO: 0.03 10*3/MM3 (ref 0–0.05)
IMM GRANULOCYTES # BLD AUTO: 0.03 10*3/MM3 (ref 0–0.05)
IMM GRANULOCYTES NFR BLD AUTO: 0.1 % (ref 0–0.5)
IMM GRANULOCYTES NFR BLD AUTO: 0.3 % (ref 0–0.5)
IMM GRANULOCYTES NFR BLD AUTO: 0.3 % (ref 0–0.5)
KETONES UR QL STRIP: NEGATIVE
LDLC SERPL CALC-MCNC: 150 MG/DL (ref 0–100)
LDLC/HDLC SERPL: 2.43 {RATIO}
LEUKOCYTE ESTERASE UR QL STRIP.AUTO: ABNORMAL
LYMPHOCYTES # BLD AUTO: 1.86 10*3/MM3 (ref 0.7–3.1)
LYMPHOCYTES # BLD AUTO: 2.87 10*3/MM3 (ref 0.7–3.1)
LYMPHOCYTES # BLD AUTO: 2.97 10*3/MM3 (ref 0.7–3.1)
LYMPHOCYTES NFR BLD AUTO: 21.9 % (ref 19.6–45.3)
LYMPHOCYTES NFR BLD AUTO: 28.6 % (ref 19.6–45.3)
LYMPHOCYTES NFR BLD AUTO: 29.3 % (ref 19.6–45.3)
MAGNESIUM SERPL-MCNC: 2.4 MG/DL (ref 1.6–2.6)
MAGNESIUM SERPL-MCNC: 2.5 MG/DL (ref 1.6–2.6)
MAGNESIUM SERPL-MCNC: 2.5 MG/DL (ref 1.6–2.6)
MCH RBC QN AUTO: 27.9 PG (ref 26.6–33)
MCH RBC QN AUTO: 28.5 PG (ref 26.6–33)
MCH RBC QN AUTO: 28.7 PG (ref 26.6–33)
MCHC RBC AUTO-ENTMCNC: 34.1 G/DL (ref 31.5–35.7)
MCHC RBC AUTO-ENTMCNC: 34.5 G/DL (ref 31.5–35.7)
MCHC RBC AUTO-ENTMCNC: 35.5 G/DL (ref 31.5–35.7)
MCV RBC AUTO: 80.3 FL (ref 79–97)
MCV RBC AUTO: 81.7 FL (ref 79–97)
MCV RBC AUTO: 83.2 FL (ref 79–97)
METHADONE UR QL SCN: NEGATIVE
MONOCYTES # BLD AUTO: 0.4 10*3/MM3 (ref 0.1–0.9)
MONOCYTES # BLD AUTO: 0.55 10*3/MM3 (ref 0.1–0.9)
MONOCYTES # BLD AUTO: 0.67 10*3/MM3 (ref 0.1–0.9)
MONOCYTES NFR BLD AUTO: 4.7 % (ref 5–12)
MONOCYTES NFR BLD AUTO: 5.6 % (ref 5–12)
MONOCYTES NFR BLD AUTO: 6.4 % (ref 5–12)
NEUTROPHILS NFR BLD AUTO: 5.64 10*3/MM3 (ref 1.7–7)
NEUTROPHILS NFR BLD AUTO: 5.95 10*3/MM3 (ref 1.7–7)
NEUTROPHILS NFR BLD AUTO: 6.58 10*3/MM3 (ref 1.7–7)
NEUTROPHILS NFR BLD AUTO: 60.7 % (ref 42.7–76)
NEUTROPHILS NFR BLD AUTO: 63.3 % (ref 42.7–76)
NEUTROPHILS NFR BLD AUTO: 66.5 % (ref 42.7–76)
NITRITE UR QL STRIP: NEGATIVE
NRBC BLD AUTO-RTO: 0 /100 WBC (ref 0–0.2)
OPIATES UR QL: NEGATIVE
OXYCODONE UR QL SCN: NEGATIVE
PH UR STRIP.AUTO: 6 [PH] (ref 5–8)
PHOSPHATE SERPL-MCNC: 2.3 MG/DL (ref 2.5–4.5)
PHOSPHATE SERPL-MCNC: 3.2 MG/DL (ref 2.5–4.5)
PLATELET # BLD AUTO: 313 10*3/MM3 (ref 140–450)
PLATELET # BLD AUTO: 316 10*3/MM3 (ref 140–450)
PLATELET # BLD AUTO: 349 10*3/MM3 (ref 140–450)
PMV BLD AUTO: 10 FL (ref 6–12)
PMV BLD AUTO: 9.1 FL (ref 6–12)
PMV BLD AUTO: 9.9 FL (ref 6–12)
POTASSIUM SERPL-SCNC: 2.8 MMOL/L (ref 3.5–5.2)
POTASSIUM SERPL-SCNC: 3.1 MMOL/L (ref 3.5–5.2)
POTASSIUM SERPL-SCNC: 3.4 MMOL/L (ref 3.5–5.2)
POTASSIUM SERPL-SCNC: 3.9 MMOL/L (ref 3.5–5.2)
PROT SERPL-MCNC: 7.8 G/DL (ref 6–8.5)
PROT SERPL-MCNC: 8 G/DL (ref 6–8.5)
PROT UR QL STRIP: ABNORMAL
QT INTERVAL: 494 MS
RBC # BLD AUTO: 5.05 10*6/MM3 (ref 3.77–5.28)
RBC # BLD AUTO: 5.24 10*6/MM3 (ref 3.77–5.28)
RBC # BLD AUTO: 5.48 10*6/MM3 (ref 3.77–5.28)
RBC # UR STRIP: ABNORMAL /HPF
REF LAB TEST METHOD: ABNORMAL
SALICYLATES SERPL-MCNC: <0.3 MG/DL
SARS-COV-2 RNA RESP QL NAA+PROBE: NOT DETECTED
SODIUM SERPL-SCNC: 132 MMOL/L (ref 136–145)
SODIUM SERPL-SCNC: 136 MMOL/L (ref 136–145)
SODIUM SERPL-SCNC: 136 MMOL/L (ref 136–145)
SODIUM SERPL-SCNC: 139 MMOL/L (ref 136–145)
SP GR UR STRIP: 1.02 (ref 1–1.03)
SQUAMOUS #/AREA URNS HPF: ABNORMAL /HPF
T4 FREE SERPL-MCNC: 1.44 NG/DL (ref 0.93–1.7)
TRIGL SERPL-MCNC: 94 MG/DL (ref 0–150)
TSH SERPL DL<=0.05 MIU/L-ACNC: 0.45 UIU/ML (ref 0.27–4.2)
UROBILINOGEN UR QL STRIP: ABNORMAL
VLDLC SERPL-MCNC: 17 MG/DL (ref 5–40)
WBC # UR STRIP: ABNORMAL /HPF
WBC NRBC COR # BLD: 10.39 10*3/MM3 (ref 3.4–10.8)
WBC NRBC COR # BLD: 8.49 10*3/MM3 (ref 3.4–10.8)
WBC NRBC COR # BLD: 9.8 10*3/MM3 (ref 3.4–10.8)
WHOLE BLOOD HOLD SPECIMEN: NORMAL
WHOLE BLOOD HOLD SPECIMEN: NORMAL

## 2022-01-01 PROCEDURE — 25010000002 MORPHINE PER 10 MG: Performed by: INTERNAL MEDICINE

## 2022-01-01 PROCEDURE — 82077 ASSAY SPEC XCP UR&BREATH IA: CPT | Performed by: EMERGENCY MEDICINE

## 2022-01-01 PROCEDURE — 80048 BASIC METABOLIC PNL TOTAL CA: CPT | Performed by: INTERNAL MEDICINE

## 2022-01-01 PROCEDURE — 63710000001 ONDANSETRON PER 8 MG: Performed by: GENERAL PRACTICE

## 2022-01-01 PROCEDURE — 93005 ELECTROCARDIOGRAM TRACING: CPT | Performed by: NURSE PRACTITIONER

## 2022-01-01 PROCEDURE — 83735 ASSAY OF MAGNESIUM: CPT | Performed by: INTERNAL MEDICINE

## 2022-01-01 PROCEDURE — 83036 HEMOGLOBIN GLYCOSYLATED A1C: CPT | Performed by: GENERAL PRACTICE

## 2022-01-01 PROCEDURE — 25010000002 INFLUENZA VAC SPLIT QUAD 0.5 ML SUSPENSION PREFILLED SYRINGE: Performed by: INTERNAL MEDICINE

## 2022-01-01 PROCEDURE — 80061 LIPID PANEL: CPT | Performed by: GENERAL PRACTICE

## 2022-01-01 PROCEDURE — 85025 COMPLETE CBC W/AUTO DIFF WBC: CPT | Performed by: INTERNAL MEDICINE

## 2022-01-01 PROCEDURE — 99239 HOSP IP/OBS DSCHRG MGMT >30: CPT | Performed by: INTERNAL MEDICINE

## 2022-01-01 PROCEDURE — 36415 COLL VENOUS BLD VENIPUNCTURE: CPT | Performed by: GENERAL PRACTICE

## 2022-01-01 PROCEDURE — 84100 ASSAY OF PHOSPHORUS: CPT | Performed by: INTERNAL MEDICINE

## 2022-01-01 PROCEDURE — 80307 DRUG TEST PRSMV CHEM ANLYZR: CPT | Performed by: EMERGENCY MEDICINE

## 2022-01-01 PROCEDURE — 82306 VITAMIN D 25 HYDROXY: CPT | Performed by: INTERNAL MEDICINE

## 2022-01-01 PROCEDURE — 84703 CHORIONIC GONADOTROPIN ASSAY: CPT | Performed by: EMERGENCY MEDICINE

## 2022-01-01 PROCEDURE — 84439 ASSAY OF FREE THYROXINE: CPT | Performed by: NURSE PRACTITIONER

## 2022-01-01 PROCEDURE — 78306 BONE IMAGING WHOLE BODY: CPT

## 2022-01-01 PROCEDURE — 84443 ASSAY THYROID STIM HORMONE: CPT | Performed by: NURSE PRACTITIONER

## 2022-01-01 PROCEDURE — 25010000002 MORPHINE PER 10 MG: Performed by: EMERGENCY MEDICINE

## 2022-01-01 PROCEDURE — 80053 COMPREHEN METABOLIC PANEL: CPT | Performed by: GENERAL PRACTICE

## 2022-01-01 PROCEDURE — 99223 1ST HOSP IP/OBS HIGH 75: CPT | Performed by: GENERAL PRACTICE

## 2022-01-01 PROCEDURE — 99285 EMERGENCY DEPT VISIT HI MDM: CPT

## 2022-01-01 PROCEDURE — 81001 URINALYSIS AUTO W/SCOPE: CPT | Performed by: NURSE PRACTITIONER

## 2022-01-01 PROCEDURE — 87086 URINE CULTURE/COLONY COUNT: CPT | Performed by: NURSE PRACTITIONER

## 2022-01-01 PROCEDURE — 87040 BLOOD CULTURE FOR BACTERIA: CPT | Performed by: GENERAL PRACTICE

## 2022-01-01 PROCEDURE — 25010000002 MORPHINE PER 10 MG: Performed by: GENERAL PRACTICE

## 2022-01-01 PROCEDURE — 80053 COMPREHEN METABOLIC PANEL: CPT | Performed by: EMERGENCY MEDICINE

## 2022-01-01 PROCEDURE — 80179 DRUG ASSAY SALICYLATE: CPT | Performed by: EMERGENCY MEDICINE

## 2022-01-01 PROCEDURE — U0004 COV-19 TEST NON-CDC HGH THRU: HCPCS | Performed by: NURSE PRACTITIONER

## 2022-01-01 PROCEDURE — 74177 CT ABD & PELVIS W/CONTRAST: CPT

## 2022-01-01 PROCEDURE — 99221 1ST HOSP IP/OBS SF/LOW 40: CPT | Performed by: UROLOGY

## 2022-01-01 PROCEDURE — 25010000002 ZOLEDRONIC ACID 5 MG/100ML SOLUTION: Performed by: INTERNAL MEDICINE

## 2022-01-01 PROCEDURE — 25010000002 CEFTRIAXONE PER 250 MG: Performed by: INTERNAL MEDICINE

## 2022-01-01 PROCEDURE — 25010000002 CEFTRIAXONE PER 250 MG: Performed by: NURSE PRACTITIONER

## 2022-01-01 PROCEDURE — 0 IOPAMIDOL PER 1 ML: Performed by: EMERGENCY MEDICINE

## 2022-01-01 PROCEDURE — 99233 SBSQ HOSP IP/OBS HIGH 50: CPT | Performed by: INTERNAL MEDICINE

## 2022-01-01 PROCEDURE — 80143 DRUG ASSAY ACETAMINOPHEN: CPT | Performed by: EMERGENCY MEDICINE

## 2022-01-01 PROCEDURE — 93010 ELECTROCARDIOGRAM REPORT: CPT | Performed by: INTERNAL MEDICINE

## 2022-01-01 PROCEDURE — 0 POTASSIUM CHLORIDE 10 MEQ/100ML SOLUTION: Performed by: NURSE PRACTITIONER

## 2022-01-01 PROCEDURE — 85025 COMPLETE CBC W/AUTO DIFF WBC: CPT | Performed by: EMERGENCY MEDICINE

## 2022-01-01 PROCEDURE — A9503 TC99M MEDRONATE: HCPCS | Performed by: INTERNAL MEDICINE

## 2022-01-01 PROCEDURE — 0 TECHNETIUM MEDRONATE KIT: Performed by: INTERNAL MEDICINE

## 2022-01-01 PROCEDURE — 25010000002 ONDANSETRON PER 1 MG: Performed by: EMERGENCY MEDICINE

## 2022-01-01 PROCEDURE — G0008 ADMIN INFLUENZA VIRUS VAC: HCPCS | Performed by: INTERNAL MEDICINE

## 2022-01-01 PROCEDURE — 90686 IIV4 VACC NO PRSV 0.5 ML IM: CPT | Performed by: INTERNAL MEDICINE

## 2022-01-01 PROCEDURE — 83735 ASSAY OF MAGNESIUM: CPT | Performed by: NURSE PRACTITIONER

## 2022-01-01 RX ORDER — HYDROXYZINE HYDROCHLORIDE 25 MG/1
25 TABLET, FILM COATED ORAL 3 TIMES DAILY PRN
Status: DISCONTINUED | OUTPATIENT
Start: 2022-01-01 | End: 2022-01-01 | Stop reason: HOSPADM

## 2022-01-01 RX ORDER — IBUPROFEN 400 MG/1
400 TABLET ORAL EVERY 6 HOURS PRN
Qty: 40 TABLET | Refills: 0 | Status: SHIPPED | OUTPATIENT
Start: 2022-01-01 | End: 2022-01-21

## 2022-01-01 RX ORDER — AMOXICILLIN 250 MG
2 CAPSULE ORAL 2 TIMES DAILY
Status: DISCONTINUED | OUTPATIENT
Start: 2022-01-01 | End: 2022-01-01 | Stop reason: HOSPADM

## 2022-01-01 RX ORDER — CEFTRIAXONE SODIUM 1 G/50ML
1 INJECTION, SOLUTION INTRAVENOUS DAILY
Status: DISCONTINUED | OUTPATIENT
Start: 2022-01-01 | End: 2022-01-01 | Stop reason: HOSPADM

## 2022-01-01 RX ORDER — HYDROCODONE BITARTRATE AND ACETAMINOPHEN 5; 325 MG/1; MG/1
1 TABLET ORAL EVERY 6 HOURS PRN
Status: DISCONTINUED | OUTPATIENT
Start: 2022-01-01 | End: 2022-01-01 | Stop reason: HOSPADM

## 2022-01-01 RX ORDER — CEFTRIAXONE SODIUM 1 G/50ML
1 INJECTION, SOLUTION INTRAVENOUS ONCE
Status: COMPLETED | OUTPATIENT
Start: 2022-01-01 | End: 2022-01-01

## 2022-01-01 RX ORDER — NICOTINE 21 MG/24HR
1 PATCH, TRANSDERMAL 24 HOURS TRANSDERMAL
Status: DISCONTINUED | OUTPATIENT
Start: 2022-01-01 | End: 2022-01-01 | Stop reason: HOSPADM

## 2022-01-01 RX ORDER — SODIUM CHLORIDE 0.9 % (FLUSH) 0.9 %
10 SYRINGE (ML) INJECTION AS NEEDED
Status: DISCONTINUED | OUTPATIENT
Start: 2022-01-01 | End: 2022-01-01 | Stop reason: HOSPADM

## 2022-01-01 RX ORDER — ONDANSETRON 4 MG/1
4 TABLET, FILM COATED ORAL EVERY 6 HOURS PRN
Status: DISCONTINUED | OUTPATIENT
Start: 2022-01-01 | End: 2022-01-01 | Stop reason: HOSPADM

## 2022-01-01 RX ORDER — DULOXETIN HYDROCHLORIDE 30 MG/1
30 CAPSULE, DELAYED RELEASE ORAL DAILY
Status: DISCONTINUED | OUTPATIENT
Start: 2022-01-01 | End: 2022-01-01 | Stop reason: HOSPADM

## 2022-01-01 RX ORDER — BISACODYL 5 MG/1
5 TABLET, DELAYED RELEASE ORAL DAILY PRN
Status: DISCONTINUED | OUTPATIENT
Start: 2022-01-01 | End: 2022-01-01 | Stop reason: HOSPADM

## 2022-01-01 RX ORDER — ACETAMINOPHEN 325 MG/1
650 TABLET ORAL EVERY 6 HOURS PRN
Qty: 200 TABLET | Refills: 0 | Status: SHIPPED | OUTPATIENT
Start: 2022-01-01 | End: 2022-02-09

## 2022-01-01 RX ORDER — CEFTRIAXONE SODIUM 1 G/50ML
1 INJECTION, SOLUTION INTRAVENOUS ONCE
Status: DISCONTINUED | OUTPATIENT
Start: 2022-01-01 | End: 2022-01-01

## 2022-01-01 RX ORDER — BISACODYL 10 MG
10 SUPPOSITORY, RECTAL RECTAL DAILY PRN
Status: DISCONTINUED | OUTPATIENT
Start: 2022-01-01 | End: 2022-01-01 | Stop reason: HOSPADM

## 2022-01-01 RX ORDER — ONDANSETRON 2 MG/ML
4 INJECTION INTRAMUSCULAR; INTRAVENOUS ONCE
Status: COMPLETED | OUTPATIENT
Start: 2022-01-01 | End: 2022-01-01

## 2022-01-01 RX ORDER — POLYETHYLENE GLYCOL 3350 17 G/17G
17 POWDER, FOR SOLUTION ORAL DAILY PRN
Status: DISCONTINUED | OUTPATIENT
Start: 2022-01-01 | End: 2022-01-01 | Stop reason: HOSPADM

## 2022-01-01 RX ORDER — POTASSIUM CHLORIDE 750 MG/1
40 CAPSULE, EXTENDED RELEASE ORAL ONCE
Status: COMPLETED | OUTPATIENT
Start: 2022-01-01 | End: 2022-01-01

## 2022-01-01 RX ORDER — HYDROCODONE BITARTRATE AND ACETAMINOPHEN 5; 325 MG/1; MG/1
1 TABLET ORAL EVERY 6 HOURS PRN
Qty: 12 TABLET | Refills: 0 | Status: SHIPPED | OUTPATIENT
Start: 2022-01-01 | End: 2022-01-01

## 2022-01-01 RX ORDER — ACETAMINOPHEN 325 MG/1
650 TABLET ORAL EVERY 6 HOURS PRN
Status: DISCONTINUED | OUTPATIENT
Start: 2022-01-01 | End: 2022-01-01 | Stop reason: HOSPADM

## 2022-01-01 RX ORDER — CEFDINIR 300 MG/1
300 CAPSULE ORAL 2 TIMES DAILY
Qty: 4 CAPSULE | Refills: 0 | Status: SHIPPED | OUTPATIENT
Start: 2022-01-01 | End: 2022-01-01

## 2022-01-01 RX ORDER — TC 99M MEDRONATE 20 MG/10ML
23.4 INJECTION, POWDER, LYOPHILIZED, FOR SOLUTION INTRAVENOUS
Status: COMPLETED | OUTPATIENT
Start: 2022-01-01 | End: 2022-01-01

## 2022-01-01 RX ORDER — AMOXICILLIN 250 MG
2 CAPSULE ORAL 2 TIMES DAILY
Qty: 20 TABLET | Refills: 0 | Status: SHIPPED | OUTPATIENT
Start: 2022-01-01 | End: 2022-01-16

## 2022-01-01 RX ORDER — ZOLEDRONIC ACID 5 MG/100ML
5 INJECTION, SOLUTION INTRAVENOUS ONCE
Status: COMPLETED | OUTPATIENT
Start: 2022-01-01 | End: 2022-01-01

## 2022-01-01 RX ORDER — ONDANSETRON 4 MG/1
4 TABLET, FILM COATED ORAL EVERY 8 HOURS PRN
Qty: 20 TABLET | Refills: 0 | Status: SHIPPED | OUTPATIENT
Start: 2022-01-01 | End: 2022-02-10

## 2022-01-01 RX ORDER — IBUPROFEN 400 MG/1
400 TABLET ORAL EVERY 6 HOURS PRN
Status: DISCONTINUED | OUTPATIENT
Start: 2022-01-01 | End: 2022-01-01 | Stop reason: HOSPADM

## 2022-01-01 RX ORDER — DULOXETIN HYDROCHLORIDE 30 MG/1
30 CAPSULE, DELAYED RELEASE ORAL DAILY
Qty: 30 CAPSULE | Refills: 0 | Status: SHIPPED | OUTPATIENT
Start: 2022-01-01 | End: 2022-02-10

## 2022-01-01 RX ORDER — SODIUM CHLORIDE 0.9 % (FLUSH) 0.9 %
10 SYRINGE (ML) INJECTION EVERY 12 HOURS SCHEDULED
Status: DISCONTINUED | OUTPATIENT
Start: 2022-01-01 | End: 2022-01-01 | Stop reason: HOSPADM

## 2022-01-01 RX ORDER — POTASSIUM CHLORIDE 7.45 MG/ML
10 INJECTION INTRAVENOUS
Status: COMPLETED | OUTPATIENT
Start: 2022-01-01 | End: 2022-01-01

## 2022-01-01 RX ADMIN — SENNOSIDES AND DOCUSATE SODIUM 2 TABLET: 50; 8.6 TABLET ORAL at 08:34

## 2022-01-01 RX ADMIN — MORPHINE SULFATE 4 MG: 4 INJECTION INTRAVENOUS at 08:11

## 2022-01-01 RX ADMIN — IOPAMIDOL 100 ML: 755 INJECTION, SOLUTION INTRAVENOUS at 03:37

## 2022-01-01 RX ADMIN — HYDROCODONE BITARTRATE AND ACETAMINOPHEN 1 TABLET: 5; 325 TABLET ORAL at 17:47

## 2022-01-01 RX ADMIN — POTASSIUM CHLORIDE 10 MEQ: 7.46 INJECTION, SOLUTION INTRAVENOUS at 03:00

## 2022-01-01 RX ADMIN — INFLUENZA VIRUS VACCINE 0.5 ML: 15; 15; 15; 15 SUSPENSION INTRAMUSCULAR at 13:40

## 2022-01-01 RX ADMIN — HYDROCODONE BITARTRATE AND ACETAMINOPHEN 1 TABLET: 5; 325 TABLET ORAL at 09:56

## 2022-01-01 RX ADMIN — SODIUM CHLORIDE, PRESERVATIVE FREE 10 ML: 5 INJECTION INTRAVENOUS at 20:18

## 2022-01-01 RX ADMIN — MORPHINE SULFATE 3 MG: 4 INJECTION, SOLUTION INTRAMUSCULAR; INTRAVENOUS at 16:55

## 2022-01-01 RX ADMIN — ONDANSETRON HYDROCHLORIDE 4 MG: 4 TABLET, FILM COATED ORAL at 16:55

## 2022-01-01 RX ADMIN — DULOXETINE HYDROCHLORIDE 30 MG: 30 CAPSULE, DELAYED RELEASE ORAL at 10:13

## 2022-01-01 RX ADMIN — SODIUM CHLORIDE, PRESERVATIVE FREE 10 ML: 5 INJECTION INTRAVENOUS at 08:34

## 2022-01-01 RX ADMIN — TC 99M MEDRONATE 23.4 MILLICURIE: 20 INJECTION, POWDER, LYOPHILIZED, FOR SOLUTION INTRAVENOUS at 11:30

## 2022-01-01 RX ADMIN — ONDANSETRON HYDROCHLORIDE 4 MG: 4 TABLET, FILM COATED ORAL at 10:17

## 2022-01-01 RX ADMIN — ZOLEDRONIC ACID 5 MG: 0.05 INJECTION, SOLUTION INTRAVENOUS at 20:20

## 2022-01-01 RX ADMIN — IBUPROFEN 400 MG: 400 TABLET, FILM COATED ORAL at 15:52

## 2022-01-01 RX ADMIN — NICOTINE 1 PATCH: 21 PATCH, EXTENDED RELEASE TRANSDERMAL at 00:04

## 2022-01-01 RX ADMIN — ONDANSETRON HYDROCHLORIDE 4 MG: 4 TABLET, FILM COATED ORAL at 09:56

## 2022-01-01 RX ADMIN — CEFTRIAXONE SODIUM 1 G: 1 INJECTION, SOLUTION INTRAVENOUS at 05:14

## 2022-01-01 RX ADMIN — SODIUM CHLORIDE 500 ML: 9 INJECTION, SOLUTION INTRAVENOUS at 02:29

## 2022-01-01 RX ADMIN — ONDANSETRON 4 MG: 2 INJECTION INTRAMUSCULAR; INTRAVENOUS at 03:13

## 2022-01-01 RX ADMIN — ONDANSETRON HYDROCHLORIDE 4 MG: 4 TABLET, FILM COATED ORAL at 00:04

## 2022-01-01 RX ADMIN — MORPHINE SULFATE 3 MG: 4 INJECTION, SOLUTION INTRAMUSCULAR; INTRAVENOUS at 08:32

## 2022-01-01 RX ADMIN — POTASSIUM CHLORIDE 10 MEQ: 7.46 INJECTION, SOLUTION INTRAVENOUS at 02:29

## 2022-01-01 RX ADMIN — CEFTRIAXONE SODIUM 1 G: 1 INJECTION, SOLUTION INTRAVENOUS at 08:34

## 2022-01-01 RX ADMIN — HYDROCODONE BITARTRATE AND ACETAMINOPHEN 1 TABLET: 5; 325 TABLET ORAL at 00:04

## 2022-01-01 RX ADMIN — SODIUM CHLORIDE, PRESERVATIVE FREE 10 ML: 5 INJECTION INTRAVENOUS at 08:13

## 2022-01-01 RX ADMIN — IBUPROFEN 400 MG: 400 TABLET, FILM COATED ORAL at 13:39

## 2022-01-01 RX ADMIN — HYDROXYZINE HYDROCHLORIDE 25 MG: 25 TABLET, FILM COATED ORAL at 18:23

## 2022-01-01 RX ADMIN — HYDROCODONE BITARTRATE AND ACETAMINOPHEN 1 TABLET: 5; 325 TABLET ORAL at 08:46

## 2022-01-01 RX ADMIN — SENNOSIDES AND DOCUSATE SODIUM 2 TABLET: 50; 8.6 TABLET ORAL at 20:16

## 2022-01-01 RX ADMIN — HYDROCODONE BITARTRATE AND ACETAMINOPHEN 1 TABLET: 5; 325 TABLET ORAL at 19:30

## 2022-01-01 RX ADMIN — SENNOSIDES AND DOCUSATE SODIUM 2 TABLET: 50; 8.6 TABLET ORAL at 20:18

## 2022-01-01 RX ADMIN — MORPHINE SULFATE 3 MG: 4 INJECTION, SOLUTION INTRAMUSCULAR; INTRAVENOUS at 21:25

## 2022-01-01 RX ADMIN — HYDROCODONE BITARTRATE AND ACETAMINOPHEN 1 TABLET: 5; 325 TABLET ORAL at 13:13

## 2022-01-01 RX ADMIN — POTASSIUM CHLORIDE 40 MEQ: 750 CAPSULE, EXTENDED RELEASE ORAL at 10:13

## 2022-01-01 RX ADMIN — NICOTINE 1 PATCH: 21 PATCH, EXTENDED RELEASE TRANSDERMAL at 08:46

## 2022-01-01 RX ADMIN — MORPHINE SULFATE 4 MG: 4 INJECTION INTRAVENOUS at 03:14

## 2022-01-01 RX ADMIN — DULOXETINE HYDROCHLORIDE 30 MG: 30 CAPSULE, DELAYED RELEASE ORAL at 08:46

## 2022-01-01 RX ADMIN — POTASSIUM CHLORIDE 40 MEQ: 750 CAPSULE, EXTENDED RELEASE ORAL at 02:30

## 2022-01-01 RX ADMIN — MORPHINE SULFATE 3 MG: 4 INJECTION, SOLUTION INTRAMUSCULAR; INTRAVENOUS at 12:52

## 2022-01-01 RX ADMIN — SODIUM CHLORIDE, PRESERVATIVE FREE 10 ML: 5 INJECTION INTRAVENOUS at 08:12

## 2022-01-01 RX ADMIN — HYDROCODONE BITARTRATE AND ACETAMINOPHEN 1 TABLET: 5; 325 TABLET ORAL at 06:19

## 2022-01-09 PROBLEM — N39.0 UTI (URINARY TRACT INFECTION): Status: ACTIVE | Noted: 2022-01-01

## 2022-01-09 NOTE — SIGNIFICANT NOTE
01/09/22 1306   Plan   Plan Patient in bed with close watch at bedside due to suicidal ideation.  Patient would like SO, Mary Roman, removed from contact list.  Provided son, Renzo, as  but does not have his phone number due to not having her phone at this time.  Patient verified pharmacy, address, phone.  CM updated PCP in EPIC as patient voiced wrong one was listed.  Unsure of discharge needs at this time.

## 2022-01-09 NOTE — ED PROVIDER NOTES
Subjective   The patient presents to the emergency department today complaining of right-sided flank pain and right-sided abdominal pain that she states started 4 days ago with nausea vomiting and diarrhea.  She reports that she has a history of kidney cancer in her right kidney.  She states that she is had no recent fevers.  She reports that she sees Dr. Schwartz for her kidney cancer.  She states that she has had no cough or congestion.  She reports that she has been actively vomiting with diarrhea with no blood or mucus in her stools.  Patient does have tenderness in her right flank and right lower abdomen with no rebound or guarding.  She is in no respiratory distress on exam.  Her breath sounds are clear.  Her abdomen is soft but tender.  She denies any significant urinary symptoms.  I reviewed the patient's old records and found that she was diagnosed with a right renal mass but was scheduled to have an MRI back in September to confirm whether this was thought to be cancerous or not.  The patient states that she never had followed up with Dr. Schwartz since then because she had transportation issues so technically has not been diagnosed with renal cancer.  The patient advised the nurse upon getting to the emergency department that she had thoughts of harming herself.  She states that her pain has been so bad that she wanted to kill herself.  She expressed to the nurse that she had a  at home that she would use to cut her wrist and die.  The patient confirmed this on my exam stating that she was suicidal.  She states that she has never had any psych issues prior to this and states that her pain has been so bad and that she has felt so bad that she just wanted to die.          Review of Systems   Constitutional: Positive for fatigue. Negative for chills and fever.   HENT: Negative for congestion, ear pain and sore throat.    Eyes: Negative for pain.   Respiratory: Negative for cough, chest tightness and  shortness of breath.    Cardiovascular: Negative for chest pain.   Gastrointestinal: Positive for abdominal pain, diarrhea, nausea and vomiting.   Genitourinary: Positive for flank pain. Negative for difficulty urinating, dysuria, frequency, hematuria and urgency.   Musculoskeletal: Positive for back pain. Negative for joint swelling, neck pain and neck stiffness.   Skin: Negative for pallor and rash.   Neurological: Negative for seizures and headaches.   Psychiatric/Behavioral: Positive for suicidal ideas. Negative for hallucinations.   All other systems reviewed and are negative.      Past Medical History:   Diagnosis Date   • Cancer (HCC)    • Cancer of kidney (HCC)        Allergies   Allergen Reactions   • Shellfish-Derived Products Hives       Past Surgical History:   Procedure Laterality Date   • CHOLECYSTECTOMY         History reviewed. No pertinent family history.    Social History     Socioeconomic History   • Marital status:    Tobacco Use   • Smoking status: Current Every Day Smoker     Packs/day: 0.50   • Smokeless tobacco: Never Used   Vaping Use   • Vaping Use: Never used   Substance and Sexual Activity   • Alcohol use: Never   • Drug use: Yes     Comment: heroin   • Sexual activity: Defer           Objective   Physical Exam  Vitals and nursing note reviewed.   Constitutional:       General: She is not in acute distress.     Appearance: Normal appearance. She is not toxic-appearing.   HENT:      Head: Normocephalic and atraumatic.   Eyes:      General: No scleral icterus.  Cardiovascular:      Rate and Rhythm: Normal rate and regular rhythm.      Pulses: Normal pulses.   Pulmonary:      Effort: Pulmonary effort is normal. No respiratory distress.      Breath sounds: Normal breath sounds.   Abdominal:      General: Abdomen is flat. There is no distension.      Palpations: Abdomen is soft.      Tenderness: There is abdominal tenderness. There is no guarding or rebound.   Musculoskeletal:          General: Normal range of motion.      Cervical back: Normal range of motion and neck supple. No rigidity or tenderness.   Lymphadenopathy:      Cervical: No cervical adenopathy.   Skin:     General: Skin is warm and dry.      Capillary Refill: Capillary refill takes less than 2 seconds.   Neurological:      General: No focal deficit present.      Mental Status: She is alert and oriented to person, place, and time. Mental status is at baseline.   Psychiatric:         Mood and Affect: Mood normal.         Behavior: Behavior normal.         Procedures           ED Course  ED Course as of 01/09/22 0724   Sun Jan 09, 2022   0108 Potassium(!): 2.8 [TC]   0317 ZULEYKA GAUTHIER IS IN WITH THE PT AT THIS TIME. [TC]   9681 I spoke with Dr. Monroy and she will admit the patient.  She requested that I put in a MedSurg bed with a close watch sitter.  She requested that we give her Rocephin in the emergency department.  The patient was made aware of the admission. [TC]      ED Course User Index  [TC] Diane Astorga APRN                                                 MDM  Number of Diagnoses or Management Options  Acute cystitis without hematuria: minor  Diarrhea, unspecified type: minor  Hypokalemia: minor  Non-intractable vomiting with nausea, unspecified vomiting type: minor  Right renal mass: established and worsening  Suicidal ideations: new and requires workup     Amount and/or Complexity of Data Reviewed  Clinical lab tests: reviewed  Tests in the radiology section of CPT®: reviewed  Decide to obtain previous medical records or to obtain history from someone other than the patient: yes    Risk of Complications, Morbidity, and/or Mortality  Presenting problems: low  Diagnostic procedures: low  Management options: low    Patient Progress  Patient progress: stable      Final diagnoses:   Acute cystitis without hematuria   Non-intractable vomiting with nausea, unspecified vomiting type   Diarrhea, unspecified type   Hypokalemia    Suicidal ideations   Right renal mass       ED Disposition  ED Disposition     ED Disposition Condition Comment    Decision to Admit  Level of Care: Med/Surg [1]   Diagnosis: UTI (urinary tract infection) [857822]   Admitting Physician: CHRIS SABA [[id number]]   Attending Physician: CHRIS SABA [[id number]]   Isolate for COVID?: No [0]   Bed Request Comments: PT WILL NEED A CLOSE WATCH FOR SI   Certification: I Certify That Inpatient Hospital Services Are Medically Necessary For Greater Than 2 Midnights            No follow-up provider specified.       Medication List      No changes were made to your prescriptions during this visit.          Diane Astorga, EARLENE  01/09/22 0724

## 2022-01-09 NOTE — ED NOTES
"Pt comment before coming in wanted to \"slice wrist or take a  to my hand\"     Beverley Sarmiento RN  01/09/22 0021    "

## 2022-01-09 NOTE — H&P
[facility]   Patient Name: Mariam Blanco  : 1975  MRN: [id number]  Primary Care Physician:  Shyanne Jo MD  Date of admission: 2022    Subjective   Subjective     Chief Complaint: Suicidal, intractable abdominal pain.    HPI: Mariam Blanco is a 46 y.o. female presents to the hospital complaining of intractable abdominal pain nausea vomiting and diarrhea.  States her pain is so severe that she wants to take her life.  Patient states she was diagnosed with a renal mass approximately 5 to 6 months ago.  At that time she was instructed to follow-up with urology Dr. Martines.  Patient states she has been unable to follow-up secondary to not having transportation.  She states she has lost approximately 6 pounds over the last week.  She states over the last 3 to 4 days she has had very intense abdominal pain with some stools that are bloody in nature.  Patient states she has had intractable nausea and vomiting.  She denies any other medical problems.  She states that she wants to take her life because she is unable to tolerate the pain.  I asked the patient about a plan she states she has a very concrete plan \"she would go to the garage grabbed a  and grind her wrist until they bleed to death\"  Patient has no past psychiatric history.  Initial work-up in the emergency department revealed hypokalemia with a potassium of 2.8.  Patient also had a positive UTI with a WBC of 21 -30 and small leukocytes.        Personal History   ROS     Constitutional: No fever, unintentional weight loss, malaise  HEENT: No vision changes, loss of vision, double vision, difficulty swallowing, painful swallowing, or tinnitus  Respiratory: No cough, shortness of breath, sputum production, or hemoptysis  Cardiovascular: No chest pain, palpitations, orthopnea, or paroxysmal nocturnal dyspnea  Gastrointestinal: Positive for abdominal pain, CVA tenderness, bloody " stool.  Genitourinary: No dysuria, hematuria, bladder incontinence, frequency, nocturia, or hesitancy  Musculoskeletal: No arthritis, joint swelling, deformities or joint pain  Endocrine: No fatigue, heat or cold intolerance  Hematologic: No excessive bruising or bleeding  Psychiatric: No Anxiety or depression  Neurologic: No Confusion, numbness, or weakness  Skin: No rash or open wounds         PMH  Past Medical History:   Diagnosis Date   • Cancer (HCC)    • Cancer of kidney (HCC)          PSH  Past Surgical History:   Procedure Laterality Date   • CHOLECYSTECTOMY         FH  History reviewed. No pertinent family history.    SOCIAL HISTORY   reports that she has been smoking. She has been smoking about 0.50 packs per day. She has never used smokeless tobacco. She reports current drug use. She reports that she does not drink alcohol.     ALLERGIES   Allergies   Allergen Reactions   • Shellfish-Derived Products Hives       HOME MEDICINES  None                     Objective     Vitals:   Temp:  [98.8 °F (37.1 °C)] 98.8 °F (37.1 °C)  Heart Rate:  [50-75] 50  Resp:  [18] 18  BP: (143-171)/() 171/104    Physical Exam  Vital signs were reviewed.  General appearance - Patient appears well-developed and well-nourished.    Head - Normocephalic, atraumatic.  Pupils - Equal, round, reactive to light.  Extraocular muscles are intact.  Conjunctiva is clear  Oral mucosa - Pink and moist without lesions or erythema.  Uvula is midline.  Neck - Supple.  Trachea was midline.  There is no palpable lymphadenopathy or thyromegaly.  There are no meningeal signs  Lungs -Breath sounds equal bilateral.  No crackles no rales.  Heart -Regular rate and rhythm no murmurs no gallops.  Abdomen -     tenderness to palpation.  Back - Spine is straight and midline.  There is no CVA tenderness.  Extremities - Intact x4 with full range of motion.  There is no palpable edema.  Neurologic - Cranial nerves II through XII are grossly intact.     Integument - There are no rashes.  There are no petechia or purpura lesions noted.  There are no vesicular lesions noted.           Assessment / Plan     Assessment/Plan:   Intractable abdominal pain concerning for cancer pain from renal cell carcinoma  -We will consult urology  -Treat patient's pain.    Suicidal ideation secondary to intractable pain  -Suicidal close watch.  -Consult psychiatric.    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   -Patient should follow-up with endocrine as an outpatient.     Urinary tract infection  -Start patient on Rocephin  -Urine cultures obtained.    Hypokalemia  -Potassium-potassium was replaced in the emergency department    DVT prophylaxis:  Mechanical DVT prophylaxis orders are present.    CODE STATUS:    Level Of Support Discussed With: Patient  Code Status (Patient has no pulse and is not breathing): CPR (Attempt to Resuscitate)  Medical Interventions (Patient has pulse or is breathing): Full Support    Result Review:    I have personally reviewed the results from the time of this admission to 6/11/2021 06:16 EDT and agree with these findings:  [x]  Laboratory  [x]  Microbiology  [x]  Radiology  [x]  EKG/Telemetry   []  Cardiology/Vascular   []  Pathology  []  Old records  []  Other:    Admission Status:  I believe this patient meets inpatient status.    Electronically signed by Pooja Monroy MD, 01/09/22, 6:34 AM EST.
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
## 2022-01-09 NOTE — ED PROVIDER NOTES
"Patient is 46 y.o. year old female that presents to the ED for evaluation of nausea, vomiting, diarrhea.     Physical Exam    ED Course:    /97 (BP Location: Left arm, Patient Position: Lying)   Pulse 53   Temp 97.6 °F (36.4 °C) (Oral)   Resp 16   Ht 162.6 cm (64\")   Wt 55.3 kg (121 lb 14.6 oz)   SpO2 97%   BMI 20.93 kg/m²   Results for orders placed or performed during the hospital encounter of 01/08/22   COVID-19,APTIMA PANTHER(REEMA),[facility], NP/OP SWAB IN UTM/VTM/[facility] TRANSPORT MEDIA,24 HR TAT - Swab, Nasopharynx    Specimen: Nasopharynx; Swab   Result Value Ref Range    COVID19 Not Detected Not Detected - Ref. Range   Comprehensive Metabolic Panel    Specimen: Blood   Result Value Ref Range    Glucose 112 (H) 65 - 99 mg/dL    BUN 22 (H) 6 - 20 mg/dL    Creatinine 0.87 0.57 - 1.00 mg/dL    Sodium 136 136 - 145 mmol/L    Potassium 2.8 (L) 3.5 - 5.2 mmol/L    Chloride 91 (L) 98 - 107 mmol/L    CO2 30.0 (H) 22.0 - 29.0 mmol/L    Calcium 10.0 8.6 - 10.5 mg/dL    Total Protein 8.0 6.0 - 8.5 g/dL    Albumin 4.90 3.50 - 5.20 g/dL    ALT (SGPT) 15 1 - 33 U/L    AST (SGOT) 14 1 - 32 U/L    Alkaline Phosphatase 90 39 - 117 U/L    Total Bilirubin 1.0 0.0 - 1.2 mg/dL    eGFR Non African Amer 70 >60 mL/min/1.73    Globulin 3.1 gm/dL    A/G Ratio 1.6 g/dL    BUN/Creatinine Ratio 25.3 (H) 7.0 - 25.0    Anion Gap 15.0 5.0 - 15.0 mmol/L   Acetaminophen Level    Specimen: Blood   Result Value Ref Range    Acetaminophen <5.0 0.0 - 30.0 mcg/mL   Ethanol    Specimen: Blood   Result Value Ref Range    Ethanol <10 0 - 10 mg/dL    Ethanol % <0.010 %   Salicylate Level    Specimen: Blood   Result Value Ref Range    Salicylate <0.3 <=30.0 mg/dL   Urine Drug Screen - Urine, Clean Catch    Specimen: Urine, Clean Catch   Result Value Ref Range    Amphet/Methamphet, Screen Negative Negative    Barbiturates Screen, Urine Negative Negative    Benzodiazepine Screen, Urine Negative Negative    Cocaine Screen, Urine Negative " Negative    Opiate Screen Negative Negative    THC, Screen, Urine Negative Negative    Methadone Screen, Urine Negative Negative    Oxycodone Screen, Urine Negative Negative   hCG, Serum, Qualitative    Specimen: Blood   Result Value Ref Range    HCG Qualitative Negative Negative   CBC Auto Differential    Specimen: Blood   Result Value Ref Range    WBC 10.39 3.40 - 10.80 10*3/mm3    RBC 5.48 (H) 3.77 - 5.28 10*6/mm3    Hemoglobin 15.6 12.0 - 15.9 g/dL    Hematocrit 44.0 34.0 - 46.6 %    MCV 80.3 79.0 - 97.0 fL    MCH 28.5 26.6 - 33.0 pg    MCHC 35.5 31.5 - 35.7 g/dL    RDW 13.1 12.3 - 15.4 %    RDW-SD 37.6 37.0 - 54.0 fl    MPV 9.1 6.0 - 12.0 fL    Platelets 349 140 - 450 10*3/mm3    Neutrophil % 63.3 42.7 - 76.0 %    Lymphocyte % 28.6 19.6 - 45.3 %    Monocyte % 6.4 5.0 - 12.0 %    Eosinophil % 0.8 0.3 - 6.2 %    Basophil % 0.6 0.0 - 1.5 %    Immature Grans % 0.3 0.0 - 0.5 %    Neutrophils, Absolute 6.58 1.70 - 7.00 10*3/mm3    Lymphocytes, Absolute 2.97 0.70 - 3.10 10*3/mm3    Monocytes, Absolute 0.67 0.10 - 0.90 10*3/mm3    Eosinophils, Absolute 0.08 0.00 - 0.40 10*3/mm3    Basophils, Absolute 0.06 0.00 - 0.20 10*3/mm3    Immature Grans, Absolute 0.03 0.00 - 0.05 10*3/mm3    nRBC 0.0 0.0 - 0.2 /100 WBC   Urinalysis With Microscopic If Indicated (No Culture) - Urine, Clean Catch    Specimen: Urine, Clean Catch   Result Value Ref Range    Color, UA Yellow Yellow, Straw    Appearance, UA Clear Clear    pH, UA 6.0 5.0 - 8.0    Specific Gravity, UA 1.021 1.005 - 1.030    Glucose, UA Negative Negative    Ketones, UA Negative Negative    Bilirubin, UA Negative Negative    Blood, UA Negative Negative    Protein, UA 30 mg/dL (1+) (A) Negative    Leuk Esterase, UA Small (1+) (A) Negative    Nitrite, UA Negative Negative    Urobilinogen, UA 1.0 E.U./dL 0.2 - 1.0 E.U./dL   T4, Free    Specimen: Blood   Result Value Ref Range    Free T4 1.44 0.93 - 1.70 ng/dL   TSH    Specimen: Blood   Result Value Ref Range    TSH 0.447  0.270 - 4.200 uIU/mL   Magnesium    Specimen: Blood   Result Value Ref Range    Magnesium 2.4 1.6 - 2.6 mg/dL   Urinalysis, Microscopic Only - Urine, Clean Catch    Specimen: Urine, Clean Catch   Result Value Ref Range    RBC, UA 0-2 (A) None Seen /HPF    WBC, UA 21-30 (A) None Seen /HPF    Bacteria, UA None Seen None Seen /HPF    Squamous Epithelial Cells, UA 0-2 None Seen, 0-2 /HPF    Hyaline Casts, UA 3-6 None Seen /LPF    Methodology Automated Microscopy    ECG 12 Lead   Result Value Ref Range    QT Interval 494 ms   Green Top (Gel)   Result Value Ref Range    Extra Tube Hold for add-ons.    Lavender Top   Result Value Ref Range    Extra Tube hold for add-on    Light Blue Top   Result Value Ref Range    Extra Tube hold for add-on      Medications   sodium chloride 0.9 % flush 10 mL (has no administration in time range)   sodium chloride 0.9 % flush 10 mL (has no administration in time range)   sodium chloride 0.9 % flush 10 mL (has no administration in time range)   sodium chloride 0.9 % flush 10 mL (has no administration in time range)   sennosides-docusate (PERICOLACE) 8.6-50 MG per tablet 2 tablet (has no administration in time range)     And   polyethylene glycol (MIRALAX) packet 17 g (has no administration in time range)     And   bisacodyl (DULCOLAX) EC tablet 5 mg (has no administration in time range)     And   bisacodyl (DULCOLAX) suppository 10 mg (has no administration in time range)   ondansetron (ZOFRAN) tablet 4 mg (has no administration in time range)   cefTRIAXone (ROCEPHIN) IVPB 1 g (has no administration in time range)   morphine injection 4 mg (has no administration in time range)   sodium chloride 0.9 % bolus 500 mL (0 mL Intravenous Stopped 1/9/22 0517)   potassium chloride (MICRO-K) CR capsule 40 mEq (40 mEq Oral Given 1/9/22 0230)   potassium chloride 10 mEq in 100 mL IVPB (0 mEq Intravenous Stopped 1/9/22 0611)   ondansetron (ZOFRAN) injection 4 mg (4 mg Intravenous Given 1/9/22 8336)    morphine injection 4 mg (4 mg Intravenous Given 1/9/22 8174)   iopamidol (ISOVUE-370) 76 % injection 100 mL (100 mL Intravenous Given 1/9/22 7786)   cefTRIAXone (ROCEPHIN) IVPB 1 g (0 g Intravenous Stopped 1/9/22 0611)     CT Abdomen Pelvis With Contrast    Result Date: 1/9/2022  Narrative: PROCEDURE: CT ABDOMEN PELVIS W CONTRAST  COMPARISON: [facility], CT, CT ABDOMEN PELVIS WO CONTRAST, 12/03/2021, 20:55.  INDICATIONS: RIGHT FLANK/ABD PAIN TODAY  TECHNIQUE: After obtaining the patient's consent, CT images were created with non-ionic intravenous contrast material.   PROTOCOL:   Standard imaging protocol performed    RADIATION:      Automated exposure control was utilized to minimize radiation dose.  FINDINGS:  No consolidations or pleural effusions are seen involving the lung bases.  The liver, spleen, and pancreas are unremarkable. The patient is status post cholecystectomy. The bilateral adrenal glands are symmetric and unremarkable.  Note is again made of an enhancing right renal mass measuring approximately 3.3 cm.  The mass is stable.  The bilateral kidneys are stable.  Areas of scarring are again noted involving the kidneys bilaterally.  No significant bowel dilatation or obstruction is seen. A normal-appearing air-filled appendix is observed. There is no evidence for acute appendicitis. No abnormal fluid collections are seen. No significant free fluid is observed. No significant lymphadenopathy is seen throughout the abdomen or pelvis. The bladder is decompressed. The celiac and superior mesenteric arterial distributions are well opacified without evidence for occlusion.  No acute osseous abnormalities are seen.  Areas of cortical thickening and thickened trabecula are seen throughout the osseous pelvis and proximal femurs bilaterally.  The findings suggest potential changes of Paget's disease.      Impression:   1. No evidence for acute abnormality throughout the abdomen or pelvis. 2. Stable  right renal mass suggesting potential right renal carcinoma. 3. Evidence for changes of Paget's disease involving the osseous pelvis and proximal femurs.     WALLY HERNANDEZ MD       Electronically Signed and Approved By: WALLY HERNANDEZ MD on 1/09/2022 at 4:00               MDM:      The case was discussed between the AP and myself. Patient  care including, but not limited to ordered imaging, medications, and lab results were reviewed. I then performed the substantive portion of the visit including all aspects of the medical decision making.        Luciano Alvarez MD  07:36 EST  01/09/22       Luciano Alvarez MD  01/09/22 0737

## 2022-01-09 NOTE — CONSULTS
[facility]   UROLOGY Consult Note    Patient Name: Mariam Blanco  : 1975  MRN: [id number]  Primary Care Physician:  Shyanne Jo MD  Referring Physician: No ref. provider found  Date of admission: 2022    Subjective   Subjective     Chief Complaint:  Renal mass    HPI:    46 y.o. female/  UTI renal mass     Patient admitted to medicine secondary to abdominal pain/diarrhea/possible UTI    Patient having abdominal pain and some diarrhea.  May be a little better.    Also with some suicidal ideation     2022 CT abdomen/pelvis with - possible Paget's disease.  Enhancing right renal mass measuring 3.3 cm.  Stable.  2022 0.8, GFR 73, UA-some white cells, negative otherwise    Previous    2021 CT abdomen/pelvis with - multiple areas of focal scarring on each kidney. Medial right kidney has 3 x 2.7 cm circumscribed mass with Hounsfield units of 103. Either hyperdense cyst or neoplasm. Small cyst in the medial portion of the left kidney.     21  UA-0-2 RBC, many white cells, bacteria 4+ 0-2 epithelial cells, nitrite negative     No gross hematuria     Patient had 1 kidney stone     No urologic family history,   Has never had any urologic surgery.     No cardiopulmonary history.  Smokes 0.5 packs/day .  Patient does not use blood thinner.            Review of Systems     10 systems reviewed and are negative other than what is listed in the HPI    Personal History     Past Medical History:   Diagnosis Date   • Cancer (HCC)    • Cancer of kidney (HCC)        Past Surgical History:   Procedure Laterality Date   • CHOLECYSTECTOMY         Family History: family history is not on file. Otherwise pertinent FHx was reviewed and not pertinent to current issue.    Social History:  reports that she has been smoking. She has been smoking about 0.50 packs per day. She has never used smokeless tobacco. She reports previous drug use. She reports that she does not drink alcohol.    Home Medications:        Allergies:  Allergies   Allergen Reactions   • Shellfish-Derived Products Hives       Objective    Objective     Vitals:   Temp:  [97.6 °F (36.4 °C)-99.1 °F (37.3 °C)] 99.1 °F (37.3 °C)  Heart Rate:  [50-75] 58  Resp:  [16-20] 20  BP: (143-171)/() 153/95    Physical Exam:   Constitutional: Awake, alert      Respiratory: Clear to auscultation bilaterally, nonlabored respirations    Cardiovascular: RRR, no murmurs, rubs, or gallops, palpable pedal pulses bilaterally   Gastrointestinal: Positive bowel sounds, soft, nontender, nondistended   Musculoskeletal: No bilateral ankle edema, no clubbing or cyanosis to extremities    Skin: No rashes     Result Review    Result Review:  I have personally reviewed the results from the time of this admission to 1/9/2022 10:04 EST and agree with these findings:  []  Laboratory  []  Microbiology  []  Radiology  []  EKG/Telemetry   []  Cardiology/Vascular   []  Pathology  []  Old records  []  Other:      Assessment/Plan   Assessment / Plan     Brief Patient Summary:  Mariam Blanco is a 46 y.o. female     Active Hospital Problems:  Active Hospital Problems    Diagnosis    • UTI (urinary tract infection)        Plan:     CT images and read reviewed and discussed with the patient    Discussed with the patient that she is having no pain from his renal mass, it has not changed in size in the last several months.  It is causing no pain pain is coming from something else.  Patient voiced understanding      Patient with a right-sided enhancing 3.3 cm mass stable for the last several months based on recent CT.  Discussed with patient this has about 80% chance of being a renal cell carcinoma.  We discussed that because of such an endophytic lesion she would require right-sided nephrectomy.  I do not think this mass has anything to do with her recent presentation.  Mass of the size it has not changed in several months would not cause any abdominal pain and is again just a incidental  finding on CT.    I did discuss failure to treat this could be detrimental to her health or cause death.    I did recommend because this is a endophytic lesion I do feel like we should try to see if we get a renal mass biopsy of this in the coming weeks when she is doing better and patient voiced understanding.  I did discuss that this came back as renal cell carcinoma are indeterminant we would proceed with nephrectomy to remove this lesion/cancer.  If it came back as oncocytoma or other benign lesion we would follow.    Patient voiced understanding    Thank you for the consult, I will make outpatient follow-up.  Call with questions      30 minutes was used in counseling coronation of care    Electronically signed by Lavon Schwartz MD, 01/09/22, 10:04 AM EST.

## 2022-01-09 NOTE — PLAN OF CARE
Goal Outcome Evaluation:  Plan of Care Reviewed With: patient        Progress: improving  Outcome Summary: Patient admitted this shift from ed for uti, right flank pain. Patient had close watch for s/i, patient denied s/i to this nurse, patient seen by marlene chavira and close watch CARRI'd. Patient has had c/o pain multiple times throughout shift, PRN pain meds given. Patient has had c/o nausea, PRN zofran administered. VSS. Patient up ad monica, patient denies having any diarrhea since being on floor. Patient eating regular diet and tolerating.
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
## 2022-01-10 NOTE — DISCHARGE INSTR - APPOINTMENTS
Follow up with Dr. Schwartz at his office  Tuesday 1/18/2022  11:15am   [address] [phone]

## 2022-01-10 NOTE — PLAN OF CARE
Problem: Adult Inpatient Plan of Care  Goal: Plan of Care Review  Outcome: Ongoing, Progressing  Flowsheets (Taken 1/9/2022 1740 by Светлана Clark RN)  Plan of Care Reviewed With: patient     Problem: Pain Acute  Goal: Optimal Pain Control  Outcome: Ongoing, Progressing  Intervention: Develop Pain Management Plan  Recent Flowsheet Documentation  Taken 1/9/2022 2125 by Simi Middleton RN  Pain Management Interventions: see MAR  Intervention: Prevent or Manage Pain  Recent Flowsheet Documentation  Taken 1/9/2022 2018 by Simi Middleton RN  Sleep/Rest Enhancement:   awakenings minimized   relaxation techniques promoted   room darkened   regular sleep/rest pattern promoted  Intervention: Optimize Psychosocial Wellbeing  Recent Flowsheet Documentation  Taken 1/9/2022 2018 by Simi Middleton RN  Supportive Measures:   active listening utilized   self-care encouraged   Goal Outcome Evaluation:     Pt a&o x4. VSS. Given pain meds per MAR this shift. She reports feeling that the norco provides better relief than morphine. Given zofran once this shift for nausea. Reclast given last night. Per tele, Pt has been SB, NSR. No suicidal ideations this shift. Call light within reach.

## 2022-01-11 NOTE — PLAN OF CARE
Goal Outcome Evaluation:  Plan of Care Reviewed With: patient           Outcome Summary: patient adequate for discharge, personal items brought by security being sent home with patient

## 2022-01-11 NOTE — NURSING NOTE
1030: contacted security to see if we could find patient belongings, spoke to Jose, he said he would search for them    1044: patient belongings found and returned to patient room

## 2022-01-11 NOTE — DISCHARGE SUMMARY
[facility]         HOSPITALIST  DISCHARGE SUMMARY    Patient Name: Mariam Blanco  : 1975  MRN: [id number]    Date of Admission: 2022  Date of Discharge:  2022    Primary Care Physician: Leo White MD    Consults     Date and Time Order Name Status Description    2022  8:19 AM Inpatient Urology Consult Completed     2022  5:48 AM Inpatient Psychiatrist Consult      2022  5:48 AM IP Consult to Urology      2022  4:24 AM Inpatient Hospitalist Consult            Active and Resolved Hospital Problems:  Active Hospital Problems    Diagnosis POA   • UTI (urinary tract infection) [N39.0] Yes      Resolved Hospital Problems   No resolved problems to display.       Hospital Course     Hospital Course:  Mariam Blanco is a 46 y.o. female who presented with intractable abdominal pain, nausea, vomiting, diarrhea severe enough she wanted to end her life.  Pt with pmhx notable for renal mass 5-6mo prior diagnosed but did not follow up with Dr Schwartz.  Repeat imaging shows stability of mass - Dr Schwartz re-evaluated while inpatient and given stability plans for outpatient biopsy/further monitoring.  Dr Axel agee'd given SI complaints but no inpatient eval required (outpatient communicare follow up planned and cymbalta initiated).  SI complaints resolved with pain control.   Patient with possible uti treating with antibioitcs.   For severe pain treated with iv and po medications.  Imaging concerning for Paget's disease which given somewhat nonspecific pain complaints had concern was contributing to acute pain and a dose of iv zolendronic acid was administered.  NM bone scan ordered to further evaluate/establish baseline.    To be discharged home today in stable condition.  Patient will need close follow-up with urology for her renal mass and with communicator for her chronic mental health issues.  Patient will be discharged on a few more days of oral antibiotics.  Patient  also was given a short course of oral pain medication patient will be discharged home today in stable condition.          Day of Discharge     Vital Signs:  Temp:  [98.1 °F (36.7 °C)-98.9 °F (37.2 °C)] 98.7 °F (37.1 °C)  Heart Rate:  [57-88] 57  Resp:  [14-16] 16  BP: (111-157)/(65-81) 157/80  Physical Exam:   Gen: awake, resting comfortably, sleeping    HENT: ncat, mmm, no discharge  Resp: CTAB, normal respiratory effort. No wheezing   CV: RRR, no LE pitting edema  GI: Abdomen soft, right sided tenderness to palpation,  Psych: appropriate mood and affect, aox3. Tearful at times   Msk: Full ROM         Discharge Details        Discharge Medications      New Medications      Instructions Start Date   acetaminophen 325 MG tablet  Commonly known as: TYLENOL   650 mg, Oral, Every 6 Hours PRN      cefdinir 300 MG capsule  Commonly known as: OMNICEF   300 mg, Oral, 2 Times Daily      DULoxetine 30 MG capsule  Commonly known as: CYMBALTA   30 mg, Oral, Daily      HYDROcodone-acetaminophen 5-325 MG per tablet  Commonly known as: NORCO   1 tablet, Oral, Every 6 Hours PRN      ibuprofen 400 MG tablet  Commonly known as: ADVIL,MOTRIN   400 mg, Oral, Every 6 Hours PRN      ondansetron 4 MG tablet  Commonly known as: Zofran   4 mg, Oral, Every 8 Hours PRN      sennosides-docusate 8.6-50 MG per tablet  Commonly known as: PERICOLACE   2 tablets, Oral, 2 Times Daily             Allergies   Allergen Reactions   • Shellfish-Derived Products Hives       Discharge Disposition:  Home or Self Care    Diet:  Hospital:  Diet Order   Procedures   • Diet Regular; Cardiac, Consistent Carbohydrate, Renal       Discharge Activity: as tolerated       CODE STATUS:  Code Status and Medical Interventions:   Ordered at: 01/09/22 0545     Level Of Support Discussed With:    Patient     Code Status (Patient has no pulse and is not breathing):    CPR (Attempt to Resuscitate)     Medical Interventions (Patient has pulse or is breathing):    Full Support          No future appointments.    Additional Instructions for the Follow-ups that You Need to Schedule     Discharge Follow-up with PCP   As directed       Currently Documented PCP:    Leo White MD    PCP Phone Number:    [phone]     Follow Up Details: 5 DAYS         Discharge Follow-up with PCP   As directed       Currently Documented PCP:    Leo White MD    PCP Phone Number:    [phone]     Follow Up Details: in 1 week         Discharge Follow-up with Specified Provider: Dr. Schwartz; 1 Week   As directed      To: Dr. Schwartz    Follow Up: 1 Week               Pertinent  and/or Most Recent Results     PROCEDURES:   None     LAB RESULTS:      Lab 01/11/22  0517 01/10/22  0512 01/09/22  0034   WBC 8.49 9.80 10.39   HEMOGLOBIN 14.5 14.6 15.6   HEMATOCRIT 42.0 42.8 44.0   PLATELETS 313 316 349   NEUTROS ABS 5.64 5.95 6.58   IMMATURE GRANS (ABS) 0.01 0.03 0.03   LYMPHS ABS 1.86 2.87 2.97   MONOS ABS 0.40 0.55 0.67   EOS ABS 0.51* 0.34 0.08   MCV 83.2 81.7 80.3         Lab 01/11/22  0517 01/10/22  0512 01/09/22  0854 01/09/22  0034   SODIUM 136 139 132* 136   POTASSIUM 3.9 3.1* 3.4* 2.8*   CHLORIDE 101 100 91* 91*   CO2 22.8 24.7 27.2 30.0*   ANION GAP 12.2 14.3 13.8 15.0   BUN 15 18 22* 22*   CREATININE 0.77 0.83 0.84 0.87   GLUCOSE 110* 126* 112* 112*   CALCIUM 9.0 9.0 9.5 10.0   MAGNESIUM 2.5 2.5  --  2.4   PHOSPHORUS 2.3* 3.2  --   --    HEMOGLOBIN A1C  --   --  6.26*  --    TSH  --   --   --  0.447         Lab 01/09/22  0854 01/09/22  0034   TOTAL PROTEIN 7.8 8.0   ALBUMIN 4.80 4.90   GLOBULIN 3.0 3.1   ALT (SGPT) 14 15   AST (SGOT) 16 14   BILIRUBIN 1.1 1.0   ALK PHOS 80 90             Lab 01/09/22  0854   CHOLESTEROL 228*   LDL CHOL 150*   HDL CHOL 61*   TRIGLYCERIDES 94             Brief Urine Lab Results  (Last result in the past 365 days)      Color   Clarity   Blood   Leuk Est   Nitrite   Protein   CREAT   Urine HCG        01/09/22 0148 Yellow   Clear   Negative   Small (1+)   Negative    30 mg/dL (1+)               Microbiology Results (last 10 days)     Procedure Component Value - Date/Time    Blood Culture - Blood, Hand, Right [[id number]]  (Normal) Collected: 01/09/22 0854    Lab Status: Preliminary result Specimen: Blood from Hand, Right Updated: 01/11/22 0915     Blood Culture No growth at 2 days    Urine Culture - Urine, Urine, Clean Catch [[id number]]  (Normal) Collected: 01/09/22 0148    Lab Status: Final result Specimen: Urine, Clean Catch Updated: 01/10/22 0901     Urine Culture No growth    COVID-19,APTIMA PANTHER(REEMA),[facility], NP/OP SWAB IN UTM/VTM/SALINE TRANSPORT MEDIA,24 HR TAT - Swab, Nasopharynx [[id number]]  (Normal) Collected: 01/09/22 0041    Lab Status: Final result Specimen: Swab from Nasopharynx Updated: 01/09/22 0136     COVID19 Not Detected    Narrative:      Fact sheet for providers: https://www.fda.gov/media/963029/download     Fact sheet for patients: https://www.fda.gov/media/919343/download    Test performed by RT PCR.          NM Bone Scan Whole Body    Result Date: 1/10/2022  Impression:   Total body bone scan demonstrating no abnormal uptake.     JUSTINA DRIVER MD       Electronically Signed and Approved By: JUSTINA DRIVER MD on 1/10/2022 at 15:54             CT Abdomen Pelvis With Contrast    Result Date: 1/9/2022  Impression:   1. No evidence for acute abnormality throughout the abdomen or pelvis. 2. Stable right renal mass suggesting potential right renal carcinoma. 3. Evidence for changes of Paget's disease involving the osseous pelvis and proximal femurs.     WALLY HERNANDEZ MD       Electronically Signed and Approved By: WALLY HERNANDEZ MD on 1/09/2022 at 4:00                           Labs Pending at Discharge:  Pending Labs     Order Current Status    Blood Culture - Blood, Hand, Right Preliminary result            Time spent on Discharge including face to face service:  More than 35 minutes    Electronically signed by Delta Winkler DO, 01/11/22, 12:45 PM  EST.

## 2022-01-12 NOTE — TELEPHONE ENCOUNTER
Left message for patient to call back.  Kidney biopsy scheduled for 2-2-22 arrive at 0730.  Nothing by mouth after midnight 2-1-22.  Must have  and have someone stay with her for 24 hours after procedure.  Covid test 1-28-21 at 1:30pm at [address] in [facility].  Hold ALL blood thinners for 5 days prior to procedure (aspirin, fish oil, vit E, motrin, aleve, ibuprofen, Diclofenac).  [place] at [facility] at back of [facility] ([facility]  Patient should expect to be at hospital for around 6 hours that day.

## 2022-01-12 NOTE — OUTREACH NOTE
Prep Survey      Responses   [facility]? [facility]   Is LACE score < 7 ? No   Emergency Room discharge w/ pulse ox? No   Eligibility Readm Mgmt   Discharge diagnosis UTI (   Does the patient have one of the following disease processes/diagnoses(primary or secondary)? Other   Does the patient have Home health ordered? No   Is there a DME ordered? No   Prep survey completed? Yes          Ana Lilia Weebr RN

## 2022-01-12 NOTE — TELEPHONE ENCOUNTER
Spoke to Izzy in radiology about scheduling a renal mass biopsy.  Advised her Dr Schwartz spoke with Dr Bernard.  Izzy said she will call me back.

## 2022-01-12 NOTE — TELEPHONE ENCOUNTER
----- Message from Lavon Schwartz MD sent at 1/12/2022  6:30 AM EST -----  Regarding: RE: renal mass bx    Patient needs to be scheduled for right renal mass biopsy in a few weeks with radiology.  She has just been in the hospital for GI upset so I want to give her a couple weeks to try to feel better    Discussed this with Dr. Bernard already and he is agreeable.    You can ask them what labs they need beforehand.    She can follow-up 10 to 14 days after biopsy to review pathology.      ----- Message -----  From: Lavon Schwartz MD  Sent: 1/9/2022   3:59 PM EST  To: Lavon Schwartz MD  Subject: renal mass bx                                    Patient needs to get scheduled for renal mass biopsy after I discussed with radiologist

## 2022-01-13 NOTE — TELEPHONE ENCOUNTER
----- Message from Charlette Heidy sent at 1/12/2022  3:50 PM EST -----  Please call patient and let her know she doesn't have to come in 1-18-22. If she answers please inform her of her renal biopsy instructions, labs, and covid test.  I will be in clinic all day tomorrow.  Thanks so much!  ----- Message -----  From: Lavon Schwartz MD  Sent: 1/12/2022   3:45 PM EST  To: Charlette Moody    no  ----- Message -----  From: Charlette Moody  Sent: 1/12/2022   2:57 PM EST  To: Lavon Schwartz MD    This patient has a follow up with you on 1-18-22.  We got her renal mass biopsy scheduled for 2-2-22 and I put her to follow up with you on 2-14-22.  Do you want her to keep the January appt?

## 2022-01-13 NOTE — OUTREACH NOTE
Medical Week 1 Survey      Responses   [facility] patient discharged from? [facility]   Does the patient have one of the following disease processes/diagnoses(primary or secondary)? Other   Week 1 attempt successful? No   Unsuccessful attempts Attempt 1          Chrissy Lawson LPN

## 2022-01-13 NOTE — TELEPHONE ENCOUNTER
Left message for patient to call back to receive the following instructions:    Kidney biopsy scheduled for 2-2-22 arrive at 0730 at [facility].  Nothing by mouth after midnight 2-1-22.  Must have  and have someone stay with her for 24 hours after procedure.  Covid test 1-28-21 at 1:30pm at [address] in [facility].  Hold ALL blood thinners for 5 days prior to procedure (aspirin, fish oil, vit E, motrin, aleve, ibuprofen, Diclofenac).  [place] at [facility] at [facility] ([facility]  Patient should expect to be at hospital for around 6 hours that day.    Follow up with Dr Schwartz will be scheduled for 02/14/22 at 3:30pm.     She will not need appointment 01/18/22 so we will cancel that now.

## 2022-01-17 ENCOUNTER — READMISSION MANAGEMENT (OUTPATIENT)
Dept: CALL CENTER | Facility: HOSPITAL | Age: 47
End: 2022-01-17

## 2022-01-17 NOTE — OUTREACH NOTE
Medical Week 1 Survey      Responses   [facility] patient discharged from? [facility]   Does the patient have one of the following disease processes/diagnoses(primary or secondary)? Other   Week 1 attempt successful? No   Unsuccessful attempts Attempt 2          Eileen Temple RN

## 2022-01-19 ENCOUNTER — READMISSION MANAGEMENT (OUTPATIENT)
Dept: CALL CENTER | Facility: HOSPITAL | Age: 47
End: 2022-01-19

## 2022-01-19 NOTE — OUTREACH NOTE
Medical Week 1 Survey      Responses   [facility] patient discharged from? [facility]   Does the patient have one of the following disease processes/diagnoses(primary or secondary)? Other   Week 1 attempt successful? No   Unsuccessful attempts Attempt 3   Revoke Decline to participate          Kathi Ndiaye RN

## 2022-02-02 ENCOUNTER — HOSPITAL ENCOUNTER (OUTPATIENT)
Dept: CT IMAGING | Facility: HOSPITAL | Age: 47
End: 2022-02-02